# Patient Record
Sex: FEMALE | Race: WHITE | Employment: OTHER | URBAN - NONMETROPOLITAN AREA
[De-identification: names, ages, dates, MRNs, and addresses within clinical notes are randomized per-mention and may not be internally consistent; named-entity substitution may affect disease eponyms.]

---

## 2017-01-04 ENCOUNTER — OFFICE VISIT (OUTPATIENT)
Dept: CARDIOLOGY | Age: 52
End: 2017-01-04
Payer: COMMERCIAL

## 2017-01-04 VITALS
HEIGHT: 68 IN | BODY MASS INDEX: 25.16 KG/M2 | WEIGHT: 166 LBS | HEART RATE: 72 BPM | SYSTOLIC BLOOD PRESSURE: 128 MMHG | DIASTOLIC BLOOD PRESSURE: 90 MMHG

## 2017-01-04 DIAGNOSIS — I63.9 CRYPTOGENIC STROKE (HCC): Primary | ICD-10-CM

## 2017-01-04 DIAGNOSIS — I10 ESSENTIAL HYPERTENSION: ICD-10-CM

## 2017-01-04 DIAGNOSIS — Z95.818 STATUS POST PLACEMENT OF IMPLANTABLE LOOP RECORDER: ICD-10-CM

## 2017-01-04 PROCEDURE — 99213 OFFICE O/P EST LOW 20 MIN: CPT | Performed by: CLINICAL NURSE SPECIALIST

## 2017-01-04 PROCEDURE — 93291 INTERROG DEV EVAL SCRMS IP: CPT | Performed by: CLINICAL NURSE SPECIALIST

## 2017-01-04 ASSESSMENT — ENCOUNTER SYMPTOMS
HEARTBURN: 0
NAUSEA: 0
ORTHOPNEA: 0
SHORTNESS OF BREATH: 0
ABDOMINAL PAIN: 0
COUGH: 0
VOMITING: 0
BLURRED VISION: 0

## 2017-02-07 DIAGNOSIS — Z95.818 STATUS POST PLACEMENT OF IMPLANTABLE LOOP RECORDER: Primary | ICD-10-CM

## 2017-02-07 DIAGNOSIS — I63.9 CRYPTOGENIC STROKE (HCC): ICD-10-CM

## 2017-02-07 PROCEDURE — 93299 PR REM INTERROG ICPMS/SCRMS <30 D TECH REVIEW: CPT | Performed by: CLINICAL NURSE SPECIALIST

## 2017-02-07 PROCEDURE — 93298 REM INTERROG DEV EVAL SCRMS: CPT | Performed by: CLINICAL NURSE SPECIALIST

## 2017-02-28 ENCOUNTER — OFFICE VISIT (OUTPATIENT)
Dept: PRIMARY CARE CLINIC | Age: 52
End: 2017-02-28
Payer: COMMERCIAL

## 2017-02-28 VITALS
DIASTOLIC BLOOD PRESSURE: 82 MMHG | HEIGHT: 69 IN | BODY MASS INDEX: 24.44 KG/M2 | HEART RATE: 74 BPM | SYSTOLIC BLOOD PRESSURE: 132 MMHG | RESPIRATION RATE: 16 BRPM | TEMPERATURE: 97.4 F | WEIGHT: 165 LBS

## 2017-02-28 DIAGNOSIS — I83.93 SPIDER VEINS OF BOTH LOWER EXTREMITIES: ICD-10-CM

## 2017-02-28 DIAGNOSIS — Z01.419 WELL FEMALE EXAM WITH ROUTINE GYNECOLOGICAL EXAM: Primary | ICD-10-CM

## 2017-02-28 DIAGNOSIS — Z12.4 PAP SMEAR FOR CERVICAL CANCER SCREENING: ICD-10-CM

## 2017-02-28 DIAGNOSIS — Z12.31 ENCOUNTER FOR SCREENING MAMMOGRAM FOR BREAST CANCER: ICD-10-CM

## 2017-02-28 LAB
ALBUMIN SERPL-MCNC: 4.5 G/DL (ref 3.5–5.2)
ALP BLD-CCNC: 85 U/L (ref 35–104)
ALT SERPL-CCNC: 23 U/L (ref 5–33)
ANION GAP SERPL CALCULATED.3IONS-SCNC: 18 MMOL/L (ref 7–19)
AST SERPL-CCNC: 22 U/L (ref 5–32)
BILIRUB SERPL-MCNC: 0.4 MG/DL (ref 0.2–1.2)
BUN BLDV-MCNC: 14 MG/DL (ref 6–20)
CALCIUM SERPL-MCNC: 9.8 MG/DL (ref 8.6–10)
CHLORIDE BLD-SCNC: 97 MMOL/L (ref 98–111)
CHOLESTEROL, TOTAL: 187 MG/DL (ref 160–199)
CO2: 28 MMOL/L (ref 22–29)
CREAT SERPL-MCNC: 0.7 MG/DL (ref 0.5–0.9)
GFR NON-AFRICAN AMERICAN: >60
GLOBULIN: 3.2 G/DL
GLUCOSE BLD-MCNC: 99 MG/DL (ref 74–109)
HCT VFR BLD CALC: 40.2 % (ref 37–47)
HDLC SERPL-MCNC: 62 MG/DL (ref 65–121)
HEMOGLOBIN: 12.1 G/DL (ref 12–16)
LDL CHOLESTEROL CALCULATED: 87 MG/DL
MCH RBC QN AUTO: 24 PG (ref 27–31)
MCHC RBC AUTO-ENTMCNC: 30.1 G/DL (ref 33–37)
MCV RBC AUTO: 79.8 FL (ref 81–99)
PDW BLD-RTO: 16.5 % (ref 11.5–14.5)
PLATELET # BLD: 306 K/UL (ref 130–400)
PMV BLD AUTO: 11.3 FL (ref 7.4–10.4)
POTASSIUM SERPL-SCNC: 3.8 MMOL/L (ref 3.5–5)
RBC # BLD: 5.04 M/UL (ref 4.2–5.4)
SODIUM BLD-SCNC: 143 MMOL/L (ref 136–145)
TOTAL PROTEIN: 7.7 G/DL (ref 6.6–8.7)
TRIGL SERPL-MCNC: 189 MG/DL (ref 150–199)
WBC # BLD: 7 K/UL (ref 4.8–10.8)

## 2017-02-28 PROCEDURE — 99396 PREV VISIT EST AGE 40-64: CPT | Performed by: FAMILY MEDICINE

## 2017-02-28 PROCEDURE — 36415 COLL VENOUS BLD VENIPUNCTURE: CPT | Performed by: FAMILY MEDICINE

## 2017-03-03 ENCOUNTER — HOSPITAL ENCOUNTER (OUTPATIENT)
Dept: VASCULAR LAB | Age: 52
Discharge: HOME | End: 2017-03-03
Payer: COMMERCIAL

## 2017-03-03 DIAGNOSIS — I83.93 SPIDER VEINS OF BOTH LOWER EXTREMITIES: Primary | ICD-10-CM

## 2017-03-03 PROCEDURE — 93970 EXTREMITY STUDY: CPT

## 2017-03-09 DIAGNOSIS — I63.9 CRYPTOGENIC STROKE (HCC): ICD-10-CM

## 2017-03-09 DIAGNOSIS — Z95.818 STATUS POST PLACEMENT OF IMPLANTABLE LOOP RECORDER: Primary | ICD-10-CM

## 2017-03-09 PROCEDURE — 93299 PR REM INTERROG ICPMS/SCRMS <30 D TECH REVIEW: CPT | Performed by: CLINICAL NURSE SPECIALIST

## 2017-03-09 PROCEDURE — 93298 REM INTERROG DEV EVAL SCRMS: CPT | Performed by: CLINICAL NURSE SPECIALIST

## 2017-03-10 ENCOUNTER — TELEPHONE (OUTPATIENT)
Dept: NEUROLOGY | Age: 52
End: 2017-03-10

## 2017-04-10 ENCOUNTER — TELEPHONE (OUTPATIENT)
Dept: CARDIOLOGY | Age: 52
End: 2017-04-10

## 2017-05-02 RX ORDER — ATORVASTATIN CALCIUM 20 MG/1
TABLET, FILM COATED ORAL
Qty: 30 TABLET | Refills: 0 | Status: SHIPPED | OUTPATIENT
Start: 2017-05-02 | End: 2017-05-12 | Stop reason: SDUPTHER

## 2017-05-03 ENCOUNTER — OFFICE VISIT (OUTPATIENT)
Dept: CARDIOLOGY | Age: 52
End: 2017-05-03
Payer: COMMERCIAL

## 2017-05-03 VITALS
SYSTOLIC BLOOD PRESSURE: 136 MMHG | HEART RATE: 74 BPM | WEIGHT: 166 LBS | BODY MASS INDEX: 24.59 KG/M2 | HEIGHT: 69 IN | DIASTOLIC BLOOD PRESSURE: 84 MMHG

## 2017-05-03 DIAGNOSIS — I10 ESSENTIAL HYPERTENSION: ICD-10-CM

## 2017-05-03 DIAGNOSIS — Z95.818 STATUS POST PLACEMENT OF IMPLANTABLE LOOP RECORDER: ICD-10-CM

## 2017-05-03 DIAGNOSIS — I63.9 CRYPTOGENIC STROKE (HCC): Primary | ICD-10-CM

## 2017-05-03 PROCEDURE — 99213 OFFICE O/P EST LOW 20 MIN: CPT | Performed by: CLINICAL NURSE SPECIALIST

## 2017-05-03 PROCEDURE — 93291 INTERROG DEV EVAL SCRMS IP: CPT | Performed by: CLINICAL NURSE SPECIALIST

## 2017-05-03 ASSESSMENT — ENCOUNTER SYMPTOMS
ORTHOPNEA: 0
COUGH: 0
VOMITING: 0
BLURRED VISION: 0
HEARTBURN: 0
SHORTNESS OF BREATH: 0
NAUSEA: 0

## 2017-05-12 RX ORDER — ATORVASTATIN CALCIUM 20 MG/1
TABLET, FILM COATED ORAL
Qty: 90 TABLET | Refills: 3 | Status: SHIPPED | OUTPATIENT
Start: 2017-05-12 | End: 2017-06-23 | Stop reason: SDUPTHER

## 2017-05-12 RX ORDER — HYDROCHLOROTHIAZIDE 25 MG/1
TABLET ORAL
Qty: 90 TABLET | Refills: 3 | Status: SHIPPED | OUTPATIENT
Start: 2017-05-12 | End: 2018-03-05 | Stop reason: SDUPTHER

## 2017-06-06 ENCOUNTER — TELEPHONE (OUTPATIENT)
Dept: CARDIOLOGY | Age: 52
End: 2017-06-06

## 2017-06-07 ENCOUNTER — TELEPHONE (OUTPATIENT)
Dept: CARDIOLOGY | Age: 52
End: 2017-06-07

## 2017-06-14 DIAGNOSIS — Z95.818 STATUS POST PLACEMENT OF IMPLANTABLE LOOP RECORDER: ICD-10-CM

## 2017-06-14 DIAGNOSIS — I63.9 CRYPTOGENIC STROKE (HCC): Primary | ICD-10-CM

## 2017-06-14 PROCEDURE — 93298 REM INTERROG DEV EVAL SCRMS: CPT | Performed by: CLINICAL NURSE SPECIALIST

## 2017-06-14 PROCEDURE — 93299 PR REM INTERROG ICPMS/SCRMS <30 D TECH REVIEW: CPT | Performed by: CLINICAL NURSE SPECIALIST

## 2017-06-23 RX ORDER — ATORVASTATIN CALCIUM 20 MG/1
TABLET, FILM COATED ORAL
Qty: 90 TABLET | Refills: 3 | Status: SHIPPED | OUTPATIENT
Start: 2017-06-23 | End: 2018-03-05 | Stop reason: SDUPTHER

## 2017-06-29 ENCOUNTER — TELEPHONE (OUTPATIENT)
Dept: CARDIOLOGY | Age: 52
End: 2017-06-29

## 2017-07-13 ENCOUNTER — HOSPITAL ENCOUNTER (OUTPATIENT)
Dept: WOMENS IMAGING | Age: 52
Discharge: HOME | End: 2017-07-13
Payer: COMMERCIAL

## 2017-07-13 DIAGNOSIS — Z12.31 ENCOUNTER FOR SCREENING MAMMOGRAM FOR BREAST CANCER: ICD-10-CM

## 2017-07-13 PROCEDURE — 77063 BREAST TOMOSYNTHESIS BI: CPT

## 2017-07-26 DIAGNOSIS — Z95.818 STATUS POST PLACEMENT OF IMPLANTABLE LOOP RECORDER: Primary | ICD-10-CM

## 2017-07-26 DIAGNOSIS — I63.9 CRYPTOGENIC STROKE (HCC): ICD-10-CM

## 2017-07-26 PROCEDURE — 93298 REM INTERROG DEV EVAL SCRMS: CPT | Performed by: CLINICAL NURSE SPECIALIST

## 2017-07-26 PROCEDURE — 93299 PR REM INTERROG ICPMS/SCRMS <30 D TECH REVIEW: CPT | Performed by: CLINICAL NURSE SPECIALIST

## 2017-09-05 ENCOUNTER — TELEPHONE (OUTPATIENT)
Dept: PRIMARY CARE CLINIC | Age: 52
End: 2017-09-05

## 2017-09-05 RX ORDER — DEXTROMETHORPHAN HYDROBROMIDE AND PROMETHAZINE HYDROCHLORIDE 15; 6.25 MG/5ML; MG/5ML
SYRUP ORAL
Qty: 118 ML | Refills: 1 | Status: SHIPPED | OUTPATIENT
Start: 2017-09-05 | End: 2017-11-01

## 2017-09-06 DIAGNOSIS — Z45.09 ENCOUNTER FOR LOOP RECORDER CHECK: Primary | ICD-10-CM

## 2017-09-06 DIAGNOSIS — I63.9 CRYPTOGENIC STROKE (HCC): ICD-10-CM

## 2017-09-06 PROCEDURE — 93298 REM INTERROG DEV EVAL SCRMS: CPT | Performed by: CLINICAL NURSE SPECIALIST

## 2017-09-06 PROCEDURE — 93299 PR REM INTERROG ICPMS/SCRMS <30 D TECH REVIEW: CPT | Performed by: CLINICAL NURSE SPECIALIST

## 2017-09-11 ENCOUNTER — OFFICE VISIT (OUTPATIENT)
Dept: PRIMARY CARE CLINIC | Age: 52
End: 2017-09-11
Payer: COMMERCIAL

## 2017-09-11 VITALS
SYSTOLIC BLOOD PRESSURE: 122 MMHG | WEIGHT: 163 LBS | HEART RATE: 80 BPM | DIASTOLIC BLOOD PRESSURE: 70 MMHG | RESPIRATION RATE: 20 BRPM | BODY MASS INDEX: 24.14 KG/M2 | TEMPERATURE: 98 F | HEIGHT: 69 IN

## 2017-09-11 DIAGNOSIS — R09.89 SCATTERED RHONCHI OF RIGHT LUNG: ICD-10-CM

## 2017-09-11 DIAGNOSIS — J06.9 URI WITH COUGH AND CONGESTION: Primary | ICD-10-CM

## 2017-09-11 PROCEDURE — 99213 OFFICE O/P EST LOW 20 MIN: CPT | Performed by: FAMILY MEDICINE

## 2017-09-11 RX ORDER — ALBUTEROL SULFATE 90 UG/1
2 AEROSOL, METERED RESPIRATORY (INHALATION) EVERY 6 HOURS PRN
Qty: 1 INHALER | Refills: 1 | Status: SHIPPED | OUTPATIENT
Start: 2017-09-11 | End: 2020-05-26

## 2017-09-11 RX ORDER — AMOXICILLIN AND CLAVULANATE POTASSIUM 875; 125 MG/1; MG/1
TABLET, FILM COATED ORAL
Qty: 20 TABLET | Refills: 0 | Status: SHIPPED | OUTPATIENT
Start: 2017-09-11 | End: 2017-10-04

## 2017-09-17 ASSESSMENT — ENCOUNTER SYMPTOMS
SORE THROAT: 1
COUGH: 1
SINUS PRESSURE: 1
SHORTNESS OF BREATH: 1

## 2017-09-18 ENCOUNTER — HOSPITAL ENCOUNTER (OUTPATIENT)
Dept: GENERAL RADIOLOGY | Age: 52
Discharge: HOME | End: 2017-09-18
Payer: COMMERCIAL

## 2017-09-18 DIAGNOSIS — R05.9 COUGH: ICD-10-CM

## 2017-09-18 DIAGNOSIS — R05.9 COUGH: Primary | ICD-10-CM

## 2017-09-18 PROCEDURE — 71020 XR CHEST STANDARD TWO VW: CPT

## 2017-09-19 ENCOUNTER — PATIENT MESSAGE (OUTPATIENT)
Dept: PRIMARY CARE CLINIC | Age: 52
End: 2017-09-19

## 2017-09-19 RX ORDER — METHYLPREDNISOLONE 4 MG/1
TABLET ORAL
Qty: 1 KIT | Refills: 0 | Status: SHIPPED | OUTPATIENT
Start: 2017-09-19 | End: 2017-09-25

## 2017-09-19 RX ORDER — PANTOPRAZOLE SODIUM 40 MG/1
40 TABLET, DELAYED RELEASE ORAL DAILY
Qty: 30 TABLET | Refills: 3 | Status: SHIPPED | OUTPATIENT
Start: 2017-09-19 | End: 2017-11-01

## 2017-09-27 ENCOUNTER — OFFICE VISIT (OUTPATIENT)
Dept: PRIMARY CARE CLINIC | Age: 52
End: 2017-09-27
Payer: COMMERCIAL

## 2017-09-27 VITALS
TEMPERATURE: 97.2 F | SYSTOLIC BLOOD PRESSURE: 147 MMHG | RESPIRATION RATE: 20 BRPM | HEART RATE: 70 BPM | WEIGHT: 164 LBS | HEIGHT: 69 IN | BODY MASS INDEX: 24.29 KG/M2 | DIASTOLIC BLOOD PRESSURE: 90 MMHG

## 2017-09-27 DIAGNOSIS — J40 BRONCHITIS: Primary | ICD-10-CM

## 2017-09-27 PROCEDURE — 99213 OFFICE O/P EST LOW 20 MIN: CPT | Performed by: FAMILY MEDICINE

## 2017-09-27 PROCEDURE — 96372 THER/PROPH/DIAG INJ SC/IM: CPT | Performed by: FAMILY MEDICINE

## 2017-09-27 RX ORDER — BETAMETHASONE SODIUM PHOSPHATE AND BETAMETHASONE ACETATE 3; 3 MG/ML; MG/ML
12 INJECTION, SUSPENSION INTRA-ARTICULAR; INTRALESIONAL; INTRAMUSCULAR; SOFT TISSUE ONCE
Status: COMPLETED | OUTPATIENT
Start: 2017-09-27 | End: 2017-09-27

## 2017-09-27 RX ORDER — LEVOFLOXACIN 500 MG/1
500 TABLET, FILM COATED ORAL DAILY
Qty: 10 TABLET | Refills: 0 | Status: SHIPPED | OUTPATIENT
Start: 2017-09-27 | End: 2017-10-07

## 2017-09-27 RX ORDER — FEXOFENADINE HCL AND PSEUDOEPHEDRINE HCI 180; 240 MG/1; MG/1
1 TABLET, EXTENDED RELEASE ORAL DAILY
Qty: 20 TABLET | Refills: 0 | Status: SHIPPED | OUTPATIENT
Start: 2017-09-27 | End: 2017-11-01

## 2017-09-27 RX ORDER — FLUTICASONE PROPIONATE 220 UG/1
1 AEROSOL, METERED RESPIRATORY (INHALATION) 2 TIMES DAILY
Qty: 1 INHALER | Refills: 3 | Status: SHIPPED | OUTPATIENT
Start: 2017-09-27 | End: 2017-11-01

## 2017-09-27 RX ADMIN — BETAMETHASONE SODIUM PHOSPHATE AND BETAMETHASONE ACETATE 12 MG: 3; 3 INJECTION, SUSPENSION INTRA-ARTICULAR; INTRALESIONAL; INTRAMUSCULAR; SOFT TISSUE at 11:15

## 2017-09-27 ASSESSMENT — ENCOUNTER SYMPTOMS
WHEEZING: 1
VOMITING: 0
SINUS PRESSURE: 1
RHINORRHEA: 1
SORE THROAT: 1
ABDOMINAL PAIN: 0
COLOR CHANGE: 0
DIARRHEA: 0
SHORTNESS OF BREATH: 1
BACK PAIN: 0
COUGH: 1
EYE DISCHARGE: 0
NAUSEA: 0

## 2017-10-04 ENCOUNTER — OFFICE VISIT (OUTPATIENT)
Dept: OBGYN | Age: 52
End: 2017-10-04
Payer: COMMERCIAL

## 2017-10-04 VITALS
HEART RATE: 82 BPM | SYSTOLIC BLOOD PRESSURE: 147 MMHG | DIASTOLIC BLOOD PRESSURE: 89 MMHG | HEIGHT: 69 IN | WEIGHT: 161 LBS | BODY MASS INDEX: 23.85 KG/M2

## 2017-10-04 DIAGNOSIS — N85.2 UTERINE ENLARGEMENT: ICD-10-CM

## 2017-10-04 DIAGNOSIS — N95.0 PMB (POSTMENOPAUSAL BLEEDING): Primary | ICD-10-CM

## 2017-10-04 PROCEDURE — 99204 OFFICE O/P NEW MOD 45 MIN: CPT | Performed by: OBSTETRICS & GYNECOLOGY

## 2017-10-04 ASSESSMENT — ENCOUNTER SYMPTOMS
EYES NEGATIVE: 1
ALLERGIC/IMMUNOLOGIC NEGATIVE: 1
BACK PAIN: 0
DIARRHEA: 0
TROUBLE SWALLOWING: 0
BLOOD IN STOOL: 0
SHORTNESS OF BREATH: 0
CONSTIPATION: 1
COLOR CHANGE: 0
COUGH: 0

## 2017-10-04 NOTE — MR AVS SNAPSHOT
US Pelvis Complete [[id number] Custom]  10/4/2017 10/4/2018    Follow-Up    Return if symptoms worsen or fail to improve. Information from Your Visit        Department     Name Address Phone Fax    [facility] [address]      You Were Seen for:         Comments    PMB (postmenopausal bleeding)   [[id number]]         Vital Signs     Blood Pressure Pulse Height Weight Last Menstrual Period Breastfeeding? 147/89 (Site: Left Arm, Position: Sitting, Cuff Size: Medium Adult) 82 5' 9\" (1.753 m) 161 lb (73 kg) 08/24/2017 No    Body Mass Index Smoking Status                23.78 kg/m2 Never Smoker             Today's Medication Changes          These changes are accurate as of: 10/4/17  2:53 PM.  If you have any questions, ask your nurse or doctor.                STOP taking these medications           amoxicillin-clavulanate 875-125 MG per tablet   Commonly known as:  AUGMENTIN   Stopped by:  Bisi Min MD               Your Current Medications Are              fexofenadine-pseudoephedrine (ALLEGRA-D ALLERGY & CONGESTION) 180-240 MG per extended release tablet Take 1 tablet by mouth daily    levofloxacin (LEVAQUIN) 500 MG tablet Take 1 tablet by mouth daily for 10 days    fluticasone (FLOVENT HFA) 220 MCG/ACT inhaler Inhale 1 puff into the lungs 2 times daily    pantoprazole (PROTONIX) 40 MG tablet Take 1 tablet by mouth daily    albuterol sulfate HFA (PROVENTIL HFA) 108 (90 Base) MCG/ACT inhaler Inhale 2 puffs into the lungs every 6 hours as needed for Wheezing    promethazine-dextromethorphan (PROMETHAZINE-DM) 6.25-15 MG/5ML syrup 1 tsp po qhs prn severe cough    atorvastatin (LIPITOR) 20 MG tablet 1 tablet by mouth at bedtime for high cholesterol    hydrochlorothiazide (HYDRODIURIL) 25 MG tablet 1 tablet by mouth once a day for fluid and blood pressure    aspirin 81 MG EC tablet Take 1 tablet by mouth daily [employer] username and password. If you don't have a [employer] username and password but a parent or guardian has access to your record, the parent or guardian should login with their own [employer] username and password and access your record to view the After Visit Summary. Additional Information  If you have questions, please contact the physician practice where you receive care. Remember, [employer] is NOT to be used for urgent needs. For medical emergencies, dial 911. For questions regarding your [employer] account call [phone]. If you have a clinical question, please call your doctor's office.

## 2017-10-04 NOTE — PROGRESS NOTES
Subjective:      Patient ID: Daniel Harris is a 45 y.o. female. HPI     Pt presents today as referral from Dr. Teodoro Dee for menorrhagia with irregular menses. Pt is up to date on pap smear and mammogram.  Pt reports -Mar of 2016 having heavier periods than usual.  She had her last period April-May of 2016. She then started back -2017 and reports having \"the largest clots I've ever seen\". She stopped for a week and bled again -2017, spotting only. Reports no pain but felt like someone tearing at insides, -5/10. Pt denies hot flashes or night sweats. Pt has blood work with PCP office. No recent US. No change in appetite or weight. Denies dyspareunia or urinary changes. + constipation that she attributes to recent medications    Review of Systems   Constitutional: Negative for appetite change, fatigue, fever and unexpected weight change. HENT: Negative for hearing loss and trouble swallowing. Eyes: Negative. Negative for visual disturbance. Respiratory: Negative for cough and shortness of breath. Cardiovascular: Negative for chest pain and palpitations. Gastrointestinal: Positive for constipation. Negative for blood in stool and diarrhea. Endocrine: Negative. Genitourinary: Positive for menstrual problem. Negative for dyspareunia, dysuria, frequency, pelvic pain, urgency and vaginal discharge. Musculoskeletal: Negative for arthralgias, back pain and neck pain. Skin: Negative for color change and rash. Allergic/Immunologic: Negative. Neurological: Negative for dizziness, weakness and headaches. Hematological: Does not bruise/bleed easily. Psychiatric/Behavioral: Negative for agitation, confusion and sleep disturbance. The patient is not nervous/anxious. OB:   x 2    GYN:  13/irregular/7. No uterine or cervical sx. No STI.       Daniel Harris is a 45 y.o. female with the following history as recorded in [facility]:  Patient Active Problem List    Diagnosis Date Noted    Cryptogenic stroke ([facility] 12/03/2016    Status post placement of implantable loop recorder 11/23/2016    Essential hypertension 11/21/2016    Aphasia 11/20/2016    Colon cancer screening 11/04/2013    Hyperlipidemia 11/01/2013     Current Outpatient Prescriptions   Medication Sig Dispense Refill    fexofenadine-pseudoephedrine (ALLEGRA-D ALLERGY & CONGESTION) 180-240 MG per extended release tablet Take 1 tablet by mouth daily 20 tablet 0    levofloxacin (LEVAQUIN) 500 MG tablet Take 1 tablet by mouth daily for 10 days 10 tablet 0    fluticasone (FLOVENT HFA) 220 MCG/ACT inhaler Inhale 1 puff into the lungs 2 times daily 1 Inhaler 3    pantoprazole (PROTONIX) 40 MG tablet Take 1 tablet by mouth daily 30 tablet 3    albuterol sulfate HFA (PROVENTIL HFA) 108 (90 Base) MCG/ACT inhaler Inhale 2 puffs into the lungs every 6 hours as needed for Wheezing 1 Inhaler 1    promethazine-dextromethorphan (PROMETHAZINE-DM) 6.25-15 MG/5ML syrup 1 tsp po qhs prn severe cough 118 mL 1    atorvastatin (LIPITOR) 20 MG tablet 1 tablet by mouth at bedtime for high cholesterol 90 tablet 3    hydrochlorothiazide (HYDRODIURIL) 25 MG tablet 1 tablet by mouth once a day for fluid and blood pressure 90 tablet 3    aspirin 81 MG EC tablet Take 1 tablet by mouth daily 30 tablet 3    Multiple Vitamins-Minerals (THERAPEUTIC MULTIVITAMIN-MINERALS) tablet Take 1 tablet by mouth daily      Omega-3 Fatty Acids (FISH OIL) 1200 MG CAPS Take by mouth daily      Clobetasol Prop Oint-Coal Tar (CLOBETAPLUS OINTMENT EX) Apply 1 Applicatorful topically 2 times daily as needed        No current facility-administered medications for this visit. Allergies: Review of patient's allergies indicates no known allergies.   Past Medical History:   Diagnosis Date    Cryptogenic stroke ([facility] 11/23/2016    Dr. Crista Santiago    Hyperlipidemia     Hypertension     Status post placement of implantable loop recorder 11/23/2016 Dr Shiloh Gonzales     Past Surgical History:   Procedure Laterality Date    BREAST ENHANCEMENT SURGERY  1988    BREAST SURGERY      mass removal-right    COLONOSCOPY      TUBAL LIGATION       Family History   Problem Relation Age of Onset    Heart Disease Mother      cardiomyopathy, pacemaker    Hypertension Mother     Cancer Mother      non-hodgkins lymphoma in LL lobe, skin cancer    Other Mother      gb dz, phlebitis    Heart Disease Father     Lung Cancer Father     Heart Attack Brother      smoker, has stent    Heart Disease Sister      bypass, smoker    Thyroid Disease Sister      causes double vision    Heart Attack Sister     Elevated Lipids Sister     Thyroid Disease Sister     Elevated Lipids Sister     Elevated Lipids Sister     Heart Disease Sister      heart murmur    Other Brother      aaliyah dz     Social History   Substance Use Topics    Smoking status: Never Smoker    Smokeless tobacco: Never Used    Alcohol use Yes      Comment: rare     BP (!) 147/89 (Site: Left Arm, Position: Sitting, Cuff Size: Medium Adult)  Pulse 82  Ht 5' 9\" (1.753 m)  Wt 161 lb (73 kg)  LMP 08/24/2017  Breastfeeding? No Comment: 4/2016  BMI 23.78 kg/m2  Objective:   Physical Exam   Constitutional: She is oriented to person, place, and time. She appears well-developed and well-nourished. No distress. HENT:   Head: Normocephalic and atraumatic. Right Ear: External ear normal.   Left Ear: External ear normal.   Mouth/Throat: Oropharynx is clear and moist.   Eyes: Conjunctivae and EOM are normal. Pupils are equal, round, and reactive to light. Neck: Normal range of motion. Pulmonary/Chest: Effort normal. No respiratory distress. Abdominal: Soft. She exhibits no distension and no mass. There is no tenderness. There is no rebound and no guarding. Genitourinary: Rectum normal and vagina normal. There is no rash, tenderness or lesion on the right labia.  There is no rash, tenderness or lesion on the left labia. Uterus is enlarged. Uterus is not tender. Cervix exhibits no motion tenderness, no discharge and no friability. Right adnexum displays no mass, no tenderness and no fullness. Left adnexum displays no mass, no tenderness and no fullness. No bleeding in the vagina. Genitourinary Comments: Uterus 9 wk size with irregular contours palpated on the right. Musculoskeletal: Normal range of motion. She exhibits no edema or tenderness. Neurological: She is alert and oriented to person, place, and time. Skin: Skin is warm and dry. Psychiatric: She has a normal mood and affect. Her speech is normal and behavior is normal. Judgment and thought content normal. Cognition and memory are normal.       Assessment:      1. PMB (postmenopausal bleeding)  TSH with Reflex    CBC    US Pelvis Complete   2. Uterine enlargement  US Pelvis Complete           Plan:      1.  Tsh, cbc, pelvic u/s  2. Discussed possible etiologies of PMB including atrophy, fibroid, hyperplasia or endometrial malignancy. Based on exam today, suspect uterine fibroid. Discussed work-up. Patient states understanding  3.   RTC 2 weeks for EMB

## 2017-10-11 DIAGNOSIS — Z95.818 STATUS POST PLACEMENT OF IMPLANTABLE LOOP RECORDER: Primary | ICD-10-CM

## 2017-10-11 DIAGNOSIS — I63.9 CRYPTOGENIC STROKE (HCC): ICD-10-CM

## 2017-10-11 PROCEDURE — 93298 REM INTERROG DEV EVAL SCRMS: CPT | Performed by: CLINICAL NURSE SPECIALIST

## 2017-10-11 PROCEDURE — 93299 PR REM INTERROG ICPMS/SCRMS <30 D TECH REVIEW: CPT | Performed by: CLINICAL NURSE SPECIALIST

## 2017-10-20 ENCOUNTER — HOSPITAL ENCOUNTER (OUTPATIENT)
Dept: ULTRASOUND IMAGING | Age: 52
Discharge: HOME | End: 2017-10-20
Payer: COMMERCIAL

## 2017-10-20 ENCOUNTER — OFFICE VISIT (OUTPATIENT)
Dept: OBGYN | Age: 52
End: 2017-10-20
Payer: COMMERCIAL

## 2017-10-20 VITALS
DIASTOLIC BLOOD PRESSURE: 86 MMHG | SYSTOLIC BLOOD PRESSURE: 150 MMHG | BODY MASS INDEX: 23.85 KG/M2 | HEIGHT: 69 IN | WEIGHT: 161 LBS | HEART RATE: 68 BPM

## 2017-10-20 DIAGNOSIS — N85.2 UTERINE ENLARGEMENT: ICD-10-CM

## 2017-10-20 DIAGNOSIS — Z71.2 ENCOUNTER TO DISCUSS TEST RESULTS: Primary | ICD-10-CM

## 2017-10-20 DIAGNOSIS — N95.0 PMB (POSTMENOPAUSAL BLEEDING): ICD-10-CM

## 2017-10-20 DIAGNOSIS — R93.89 THICKENED ENDOMETRIUM: ICD-10-CM

## 2017-10-20 PROCEDURE — 99999 PR OFFICE/OUTPT VISIT,PROCEDURE ONLY: CPT | Performed by: OBSTETRICS & GYNECOLOGY

## 2017-10-20 PROCEDURE — 58100 BIOPSY OF UTERUS LINING: CPT | Performed by: OBSTETRICS & GYNECOLOGY

## 2017-10-20 PROCEDURE — 76830 TRANSVAGINAL US NON-OB: CPT

## 2017-10-20 PROCEDURE — 76856 US EXAM PELVIC COMPLETE: CPT

## 2017-10-20 ASSESSMENT — ENCOUNTER SYMPTOMS
BACK PAIN: 0
CONSTIPATION: 0
DIARRHEA: 0
SHORTNESS OF BREATH: 0
TROUBLE SWALLOWING: 0
COLOR CHANGE: 0
BLOOD IN STOOL: 0
COUGH: 0

## 2017-10-20 NOTE — PATIENT INSTRUCTIONS
Endometrial Biopsy: About This Test  What is it? An endometrial biopsy is a way for your doctor to take a small sample of the lining of the uterus (endometrium). The sample is looked at under a microscope for abnormal cells. An endometrial biopsy helps your doctor find problems in the endometrium. Why is this test done? An endometrial biopsy is done to check for cancer of the uterus. The test is also done if you have abnormal bleeding from your uterus or are having problems getting pregnant. The test results show how your body's hormones are affecting the lining of the uterus. How can you prepare for the test?  Talk to your doctor about all your health conditions before the test. For example, tell your doctor if you:  · Are or might be pregnant. An endometrial biopsy is not done during pregnancy. · Are taking any medicines. · Are allergic to any medicines. · Have had bleeding problems, or if you take aspirin or some other blood thinner. · Have been treated for an infection in your pelvic area. · Have any heart or lung problems. Other ways to prepare:  · Do not douche, use tampons, or use vaginal medicines for 24 hours before the test.  · Ask your doctor if you should take a pain reliever, such as ibuprofen (Advil or Motrin), 30 to 60 minutes before the test. This can help reduce any cramping pain that the test can cause. · Talk to your doctor if you have concerns about the need for the test, its risks, how it will be done, or what the results may mean. What happens before the test?  · You will empty your bladder just before the test.  · You will be asked to sign a consent form that says you understand the risks of the test and agree to have it done. What happens during the test?  · You will lie on an exam table. Your feet will be in stirrups. · The doctor may use a spray or injection to numb your cervix. The cervix is the opening to the uterus.   · The doctor will use a tool called a speculum to

## 2017-10-20 NOTE — PROGRESS NOTES
Subjective:      Patient ID: Liz Reese is a 45 y.o. female. HPI  Pt presents today for results of ultrasound and EMB procedure. Liz Reese is a 45 y. o. with history of menorrhagia who presents today for endometrial biopsy. BP (!) 150/86 (Site: Left Arm, Position: Sitting, Cuff Size: Medium Adult)   Pulse 68   Ht 5' 9\" (1.753 m)   Wt 161 lb (73 kg)   LMP 08/24/2017   Breastfeeding? No   BMI 23.78 kg/m²     Endometrial Biopsy Procedure Note    Pre-operative Diagnosis: PMB    Post-operative Diagnosis: same    Indications: postmenopausal bleeding    Procedure Details    Urine pregnancy test was not done. The risks (including infection, bleeding, pain, and uterine perforation) and benefits of the procedure were explained to the patient and Written informed consent was obtained. Antibiotic prophylaxis against endocarditis was not indicated. The patient was placed in the dorsal lithotomy position. Bimanual exam showed the uterus to be in the retroflexed position. A Graves' speculum inserted in the vagina, and the cervix prepped with povidone iodine. Endocervical curettage with a Kevorkian curette was performed. A sharp tenaculum was applied to the anterior lip of the cervix for stabilization. A sterile uterine sound was used to sound the uterus to a depth of 9cm. A Pipelle endometrial aspirator was used to sample the endometrium. Sample was sent for pathologic examination. Condition:  Stable    Complications:  None    Plan:    1. The patient was advised to call for any fever or for prolonged or severe pain or bleeding. She was advised to use OTC ibuprofen as needed for mild to moderate pain. She was advised to avoid vaginal intercourse for 48 hours or until the bleeding has completely stopped. 2. Will notify of results. Review of Systems   Constitutional: Negative for appetite change, fatigue, fever and unexpected weight change.    HENT: Negative for hearing loss and fluid and blood pressure 90 tablet 3    aspirin 81 MG EC tablet Take 1 tablet by mouth daily 30 tablet 3    Multiple Vitamins-Minerals (THERAPEUTIC MULTIVITAMIN-MINERALS) tablet Take 1 tablet by mouth daily      Omega-3 Fatty Acids (FISH OIL) 1200 MG CAPS Take by mouth daily      Clobetasol Prop Oint-Coal Tar (CLOBETAPLUS OINTMENT EX) Apply 1 Applicatorful topically 2 times daily as needed        No current facility-administered medications for this visit. Allergies: Review of patient's allergies indicates no known allergies. Past Medical History:   Diagnosis Date    Cryptogenic stroke ([address] 11/23/2016    Dr. Martha De La Paz Hyperlipidemia     Hypertension     Status post placement of implantable loop recorder 11/23/2016    Dr Sheree Rene     Past Surgical History:   Procedure Laterality Date    BREAST ENHANCEMENT SURGERY  1988    BREAST SURGERY      mass removal-right    COLONOSCOPY      TUBAL LIGATION       Family History   Problem Relation Age of Onset    Heart Disease Mother      cardiomyopathy, pacemaker    Hypertension Mother     Cancer Mother      non-hodgkins lymphoma in LL lobe, skin cancer    Other Mother      gb dz, phlebitis    Heart Disease Father     Lung Cancer Father     Heart Attack Brother      smoker, has stent    Heart Disease Sister      bypass, smoker    Thyroid Disease Sister      causes double vision    Heart Attack Sister     Elevated Lipids Sister     Thyroid Disease Sister     Elevated Lipids Sister     Elevated Lipids Sister     Heart Disease Sister      heart murmur    Other Brother      gb dz    Breast Cancer Paternal Cousin 48     Social History   Substance Use Topics    Smoking status: Never Smoker    Smokeless tobacco: Never Used    Alcohol use Yes      Comment: rare       Objective:   Physical Exam   Constitutional: She is oriented to person, place, and time. She appears well-developed and well-nourished. No distress.    HENT:   Head: Normocephalic and

## 2017-10-30 ENCOUNTER — TELEPHONE (OUTPATIENT)
Dept: OBGYN | Age: 52
End: 2017-10-30

## 2017-10-30 NOTE — TELEPHONE ENCOUNTER
Left message on patient's home and cell voicemail asking that she make an appointment to discuss results.   Appointment to be scheduled with me either Wednesday or Friday of this week

## 2017-11-01 ENCOUNTER — TELEPHONE (OUTPATIENT)
Dept: OBGYN | Age: 52
End: 2017-11-01

## 2017-11-01 ENCOUNTER — OFFICE VISIT (OUTPATIENT)
Dept: OBGYN | Age: 52
End: 2017-11-01
Payer: COMMERCIAL

## 2017-11-01 VITALS
DIASTOLIC BLOOD PRESSURE: 74 MMHG | HEIGHT: 69 IN | WEIGHT: 165 LBS | SYSTOLIC BLOOD PRESSURE: 134 MMHG | BODY MASS INDEX: 24.44 KG/M2

## 2017-11-01 DIAGNOSIS — Z71.2 ENCOUNTER TO DISCUSS TEST RESULTS: Primary | ICD-10-CM

## 2017-11-01 DIAGNOSIS — C55 MALIGNANT NEOPLASM OF UTERUS, UNSPECIFIED SITE (HCC): ICD-10-CM

## 2017-11-01 PROCEDURE — 99213 OFFICE O/P EST LOW 20 MIN: CPT | Performed by: OBSTETRICS & GYNECOLOGY

## 2017-11-01 ASSESSMENT — ENCOUNTER SYMPTOMS
ALLERGIC/IMMUNOLOGIC NEGATIVE: 1
GASTROINTESTINAL NEGATIVE: 1
RESPIRATORY NEGATIVE: 1
EYES NEGATIVE: 1

## 2017-11-01 NOTE — PROGRESS NOTES
Subjective:      Patient ID: Geoff Loja is a 45 y.o. female. Pt is here to discuss results/options for PMB, thickened endometrium. Pt had EMB on 10.20.17. Pathology showed endometrial adenocarcinoma, endometrioid type, FIGO, Grade 3. HPI   46 yo female with h/o PMB who presents today for test results from EMB done on 10-24-17. She has no new complaints. Review of Systems   Constitutional: Negative. HENT: Negative. Eyes: Negative. Respiratory: Negative. Cardiovascular: Negative. Gastrointestinal: Negative. Endocrine: Negative. Genitourinary: Positive for menstrual problem. Musculoskeletal: Negative. Skin: Negative. Allergic/Immunologic: Negative. Neurological: Negative. Hematological: Negative. Psychiatric/Behavioral: Negative.       Geoff Loja is a 45 y.o. female with the following history as recorded in [facility]:  Patient Active Problem List    Diagnosis Date Noted    Cryptogenic stroke ([facility] 12/03/2016    Status post placement of implantable loop recorder 11/23/2016    Essential hypertension 11/21/2016    Aphasia 11/20/2016    Colon cancer screening 11/04/2013    Hyperlipidemia 11/01/2013     Current Outpatient Prescriptions   Medication Sig Dispense Refill    albuterol sulfate HFA (PROVENTIL HFA) 108 (90 Base) MCG/ACT inhaler Inhale 2 puffs into the lungs every 6 hours as needed for Wheezing 1 Inhaler 1    atorvastatin (LIPITOR) 20 MG tablet 1 tablet by mouth at bedtime for high cholesterol 90 tablet 3    hydrochlorothiazide (HYDRODIURIL) 25 MG tablet 1 tablet by mouth once a day for fluid and blood pressure 90 tablet 3    aspirin 81 MG EC tablet Take 1 tablet by mouth daily 30 tablet 3    Multiple Vitamins-Minerals (THERAPEUTIC MULTIVITAMIN-MINERALS) tablet Take 1 tablet by mouth daily      Omega-3 Fatty Acids (FISH OIL) 1200 MG CAPS Take by mouth daily      Clobetasol Prop Oint-Coal Tar (CLOBETAPLUS OINTMENT EX) Apply 1 Applicatorful topically 2 times daily as needed        No current facility-administered medications for this visit. Allergies: Review of patient's allergies indicates no known allergies. Past Medical History:   Diagnosis Date    Bronchitis     Cryptogenic stroke ([address] 11/23/2016    Dr. Facundo Carrion    Hyperlipidemia     Hypertension     Status post placement of implantable loop recorder 11/23/2016    Dr Lynne Marcos     Past Surgical History:   Procedure Laterality Date    BREAST ENHANCEMENT SURGERY  1988    BREAST SURGERY      mass removal-right    COLONOSCOPY      TUBAL LIGATION       Family History   Problem Relation Age of Onset    Heart Disease Mother      cardiomyopathy, pacemaker    Hypertension Mother     Cancer Mother      non-hodgkins lymphoma in LL lobe, skin cancer    Other Mother      gb dz, phlebitis    Heart Disease Father     Lung Cancer Father     Heart Attack Brother      smoker, has stent    Heart Disease Sister      bypass, smoker    Thyroid Disease Sister      causes double vision    Heart Attack Sister     Elevated Lipids Sister     Thyroid Disease Sister     Elevated Lipids Sister     Elevated Lipids Sister     Heart Disease Sister      heart murmur    Other Brother      gb dz    Breast Cancer Paternal Cousin 48     Social History   Substance Use Topics    Smoking status: Never Smoker    Smokeless tobacco: Never Used    Alcohol use Yes      Comment: rare     /74 (Site: Right Arm, Position: Sitting, Cuff Size: Medium Adult)   Ht 5' 9\" (1.753 m)   Wt 165 lb (74.8 kg)   LMP 08/24/2017   BMI 24.37 kg/m²   Objective:   Physical Exam   Constitutional: She is oriented to person, place, and time. She appears well-developed and well-nourished. No distress. HENT:   Head: Normocephalic and atraumatic. Eyes: Conjunctivae and EOM are normal. Pupils are equal, round, and reactive to light. Neck: Normal range of motion. Pulmonary/Chest: Effort normal. No respiratory distress.

## 2017-11-27 DIAGNOSIS — Z95.818 STATUS POST PLACEMENT OF IMPLANTABLE LOOP RECORDER: Primary | ICD-10-CM

## 2017-11-27 DIAGNOSIS — I63.9 CRYPTOGENIC STROKE (HCC): ICD-10-CM

## 2017-11-27 PROCEDURE — 93299 PR REM INTERROG ICPMS/SCRMS <30 D TECH REVIEW: CPT | Performed by: CLINICAL NURSE SPECIALIST

## 2017-11-27 PROCEDURE — 93298 REM INTERROG DEV EVAL SCRMS: CPT | Performed by: CLINICAL NURSE SPECIALIST

## 2018-01-08 DIAGNOSIS — Z95.818 STATUS POST PLACEMENT OF IMPLANTABLE LOOP RECORDER: Primary | ICD-10-CM

## 2018-01-08 DIAGNOSIS — I63.9 CRYPTOGENIC STROKE (HCC): ICD-10-CM

## 2018-01-08 PROCEDURE — 93298 REM INTERROG DEV EVAL SCRMS: CPT | Performed by: CLINICAL NURSE SPECIALIST

## 2018-01-08 PROCEDURE — 93299 PR REM INTERROG ICPMS/SCRMS <30 D TECH REVIEW: CPT | Performed by: CLINICAL NURSE SPECIALIST

## 2018-02-21 DIAGNOSIS — I63.9 CRYPTOGENIC STROKE (HCC): ICD-10-CM

## 2018-02-21 DIAGNOSIS — Z95.818 STATUS POST PLACEMENT OF IMPLANTABLE LOOP RECORDER: Primary | ICD-10-CM

## 2018-02-21 PROCEDURE — 93299 PR REM INTERROG ICPMS/SCRMS <30 D TECH REVIEW: CPT | Performed by: NURSE PRACTITIONER

## 2018-02-21 PROCEDURE — 93298 REM INTERROG DEV EVAL SCRMS: CPT | Performed by: NURSE PRACTITIONER

## 2018-03-05 ENCOUNTER — OFFICE VISIT (OUTPATIENT)
Dept: PRIMARY CARE CLINIC | Age: 53
End: 2018-03-05
Payer: COMMERCIAL

## 2018-03-05 VITALS
SYSTOLIC BLOOD PRESSURE: 122 MMHG | WEIGHT: 160 LBS | BODY MASS INDEX: 23.7 KG/M2 | HEIGHT: 69 IN | RESPIRATION RATE: 20 BRPM | HEART RATE: 87 BPM | TEMPERATURE: 98.1 F | DIASTOLIC BLOOD PRESSURE: 80 MMHG | OXYGEN SATURATION: 95 %

## 2018-03-05 DIAGNOSIS — Z01.419 WELL FEMALE EXAM WITH ROUTINE GYNECOLOGICAL EXAM: Primary | ICD-10-CM

## 2018-03-05 DIAGNOSIS — Z12.31 ENCOUNTER FOR SCREENING MAMMOGRAM FOR BREAST CANCER: ICD-10-CM

## 2018-03-05 DIAGNOSIS — Z23 NEED FOR DIPHTHERIA-TETANUS-PERTUSSIS (TDAP) VACCINE, ADULT/ADOLESCENT: ICD-10-CM

## 2018-03-05 DIAGNOSIS — Z11.59 ENCOUNTER FOR HEPATITIS C SCREENING TEST FOR LOW RISK PATIENT: ICD-10-CM

## 2018-03-05 LAB
ALBUMIN SERPL-MCNC: 4.4 G/DL (ref 3.5–5.2)
ALP BLD-CCNC: 88 U/L (ref 35–104)
ALT SERPL-CCNC: 21 U/L (ref 5–33)
ANION GAP SERPL CALCULATED.3IONS-SCNC: 13 MMOL/L (ref 7–19)
AST SERPL-CCNC: 25 U/L (ref 5–32)
BILIRUB SERPL-MCNC: 0.3 MG/DL (ref 0.2–1.2)
BUN BLDV-MCNC: 15 MG/DL (ref 6–20)
CALCIUM SERPL-MCNC: 9.7 MG/DL (ref 8.6–10)
CHLORIDE BLD-SCNC: 99 MMOL/L (ref 98–111)
CHOLESTEROL, TOTAL: 172 MG/DL (ref 160–199)
CO2: 30 MMOL/L (ref 22–29)
CREAT SERPL-MCNC: 0.7 MG/DL (ref 0.5–0.9)
GFR NON-AFRICAN AMERICAN: >60
GLUCOSE BLD-MCNC: 93 MG/DL (ref 74–109)
HCT VFR BLD CALC: 40.2 % (ref 37–47)
HDLC SERPL-MCNC: 63 MG/DL (ref 65–121)
HEMOGLOBIN: 12.4 G/DL (ref 12–16)
HEPATITIS C ANTIBODY INTERPRETATION: NORMAL
LDL CHOLESTEROL CALCULATED: 83 MG/DL
MCH RBC QN AUTO: 23.4 PG (ref 27–31)
MCHC RBC AUTO-ENTMCNC: 30.8 G/DL (ref 33–37)
MCV RBC AUTO: 75.8 FL (ref 81–99)
PDW BLD-RTO: 20.6 % (ref 11.5–14.5)
PLATELET # BLD: 298 K/UL (ref 130–400)
PMV BLD AUTO: 10 FL (ref 9.4–12.3)
POTASSIUM SERPL-SCNC: 3.5 MMOL/L (ref 3.5–5)
RBC # BLD: 5.3 M/UL (ref 4.2–5.4)
SODIUM BLD-SCNC: 142 MMOL/L (ref 136–145)
TOTAL PROTEIN: 7.9 G/DL (ref 6.6–8.7)
TRIGL SERPL-MCNC: 129 MG/DL (ref 0–149)
WBC # BLD: 6.5 K/UL (ref 4.8–10.8)

## 2018-03-05 PROCEDURE — 90471 IMMUNIZATION ADMIN: CPT | Performed by: FAMILY MEDICINE

## 2018-03-05 PROCEDURE — 36415 COLL VENOUS BLD VENIPUNCTURE: CPT | Performed by: FAMILY MEDICINE

## 2018-03-05 PROCEDURE — 99396 PREV VISIT EST AGE 40-64: CPT | Performed by: FAMILY MEDICINE

## 2018-03-05 PROCEDURE — 90715 TDAP VACCINE 7 YRS/> IM: CPT | Performed by: FAMILY MEDICINE

## 2018-03-05 RX ORDER — ATORVASTATIN CALCIUM 20 MG/1
TABLET, FILM COATED ORAL
Qty: 90 TABLET | Refills: 3 | Status: SHIPPED | OUTPATIENT
Start: 2018-03-05 | End: 2019-03-04 | Stop reason: SDUPTHER

## 2018-03-05 RX ORDER — HYDROCHLOROTHIAZIDE 25 MG/1
TABLET ORAL
Qty: 90 TABLET | Refills: 3 | Status: SHIPPED | OUTPATIENT
Start: 2018-03-05 | End: 2019-03-04 | Stop reason: SDUPTHER

## 2018-03-05 ASSESSMENT — PATIENT HEALTH QUESTIONNAIRE - PHQ9
2. FEELING DOWN, DEPRESSED OR HOPELESS: 0
SUM OF ALL RESPONSES TO PHQ9 QUESTIONS 1 & 2: 0
1. LITTLE INTEREST OR PLEASURE IN DOING THINGS: 0
SUM OF ALL RESPONSES TO PHQ QUESTIONS 1-9: 0

## 2018-03-05 NOTE — PROGRESS NOTES
Incorporated. Care instructions adapted under license by [facility] ([facility]). If you have questions about a medical condition or this instruction, always ask your healthcare professional. [address] any warranty or liability for your use of this information.

## 2018-04-02 DIAGNOSIS — Z95.818 STATUS POST PLACEMENT OF IMPLANTABLE LOOP RECORDER: Primary | ICD-10-CM

## 2018-04-02 DIAGNOSIS — I63.9 CRYPTOGENIC STROKE (HCC): ICD-10-CM

## 2018-04-02 PROCEDURE — 93298 REM INTERROG DEV EVAL SCRMS: CPT | Performed by: NURSE PRACTITIONER

## 2018-04-02 PROCEDURE — 93299 PR REM INTERROG ICPMS/SCRMS <30 D TECH REVIEW: CPT | Performed by: NURSE PRACTITIONER

## 2018-05-03 ENCOUNTER — OFFICE VISIT (OUTPATIENT)
Dept: CARDIOLOGY | Age: 53
End: 2018-05-03
Payer: COMMERCIAL

## 2018-05-03 VITALS
WEIGHT: 161 LBS | HEART RATE: 86 BPM | DIASTOLIC BLOOD PRESSURE: 82 MMHG | SYSTOLIC BLOOD PRESSURE: 132 MMHG | BODY MASS INDEX: 23.85 KG/M2 | HEIGHT: 69 IN

## 2018-05-03 DIAGNOSIS — Z95.818 STATUS POST PLACEMENT OF IMPLANTABLE LOOP RECORDER: ICD-10-CM

## 2018-05-03 DIAGNOSIS — I63.9 CRYPTOGENIC STROKE (HCC): Primary | ICD-10-CM

## 2018-05-03 DIAGNOSIS — I10 ESSENTIAL HYPERTENSION: ICD-10-CM

## 2018-05-03 PROCEDURE — 99213 OFFICE O/P EST LOW 20 MIN: CPT | Performed by: CLINICAL NURSE SPECIALIST

## 2018-05-03 PROCEDURE — 93291 INTERROG DEV EVAL SCRMS IP: CPT | Performed by: CLINICAL NURSE SPECIALIST

## 2018-05-03 ASSESSMENT — ENCOUNTER SYMPTOMS
COUGH: 0
HEARTBURN: 0
BLURRED VISION: 0
ORTHOPNEA: 0
NAUSEA: 0
SHORTNESS OF BREATH: 0

## 2018-06-07 DIAGNOSIS — Z95.818 STATUS POST PLACEMENT OF IMPLANTABLE LOOP RECORDER: Primary | ICD-10-CM

## 2018-06-07 DIAGNOSIS — I63.9 CRYPTOGENIC STROKE (HCC): ICD-10-CM

## 2018-06-07 PROCEDURE — 93299 PR REM INTERROG ICPMS/SCRMS <30 D TECH REVIEW: CPT | Performed by: CLINICAL NURSE SPECIALIST

## 2018-06-07 PROCEDURE — 93298 REM INTERROG DEV EVAL SCRMS: CPT | Performed by: CLINICAL NURSE SPECIALIST

## 2018-07-16 ENCOUNTER — OFFICE VISIT (OUTPATIENT)
Dept: PRIMARY CARE CLINIC | Age: 53
End: 2018-07-16
Payer: COMMERCIAL

## 2018-07-16 ENCOUNTER — HOSPITAL ENCOUNTER (OUTPATIENT)
Dept: WOMENS IMAGING | Age: 53
Discharge: HOME | End: 2018-07-16
Payer: COMMERCIAL

## 2018-07-16 VITALS
WEIGHT: 164.6 LBS | RESPIRATION RATE: 18 BRPM | TEMPERATURE: 98 F | BODY MASS INDEX: 24.38 KG/M2 | OXYGEN SATURATION: 98 % | DIASTOLIC BLOOD PRESSURE: 78 MMHG | SYSTOLIC BLOOD PRESSURE: 128 MMHG | HEART RATE: 80 BPM | HEIGHT: 69 IN

## 2018-07-16 DIAGNOSIS — L23.7 ALLERGIC DERMATITIS DUE TO POISON IVY: Primary | ICD-10-CM

## 2018-07-16 DIAGNOSIS — Z12.31 ENCOUNTER FOR SCREENING MAMMOGRAM FOR BREAST CANCER: ICD-10-CM

## 2018-07-16 PROCEDURE — 77063 BREAST TOMOSYNTHESIS BI: CPT

## 2018-07-16 PROCEDURE — 99213 OFFICE O/P EST LOW 20 MIN: CPT | Performed by: NURSE PRACTITIONER

## 2018-07-16 PROCEDURE — 96372 THER/PROPH/DIAG INJ SC/IM: CPT | Performed by: NURSE PRACTITIONER

## 2018-07-16 RX ORDER — PREDNISONE 10 MG/1
TABLET ORAL
Qty: 18 TABLET | Refills: 0 | Status: SHIPPED | OUTPATIENT
Start: 2018-07-16 | End: 2019-03-25 | Stop reason: ALTCHOICE

## 2018-07-16 RX ORDER — TRIAMCINOLONE ACETONIDE 40 MG/ML
40 INJECTION, SUSPENSION INTRA-ARTICULAR; INTRAMUSCULAR ONCE
Status: COMPLETED | OUTPATIENT
Start: 2018-07-16 | End: 2018-07-16

## 2018-07-16 RX ADMIN — TRIAMCINOLONE ACETONIDE 40 MG: 40 INJECTION, SUSPENSION INTRA-ARTICULAR; INTRAMUSCULAR at 17:27

## 2018-07-16 NOTE — PROGRESS NOTES
smoker, has stent    Heart Disease Sister         bypass, smoker    Thyroid Disease Sister         causes double vision    Heart Attack Sister     Elevated Lipids Sister     Thyroid Disease Sister     Elevated Lipids Sister     Elevated Lipids Sister     Heart Disease Sister         heart murmur    Other Brother         gb dz    Breast Cancer Paternal Cousin 48       Social History   Substance Use Topics    Smoking status: Never Smoker    Smokeless tobacco: Never Used    Alcohol use No      Current Outpatient Prescriptions   Medication Sig Dispense Refill    predniSONE (DELTASONE) 10 MG tablet Days 1-3 take 1 PO TID, days 4-6 take 1 PO BID, days 7-9 take 1 PO daily. 18 tablet 0    vitamin D (CHOLECALCIFEROL) 1000 UNIT TABS tablet Take 1,000 Units by mouth daily      atorvastatin (LIPITOR) 20 MG tablet 1 tablet by mouth at bedtime for high cholesterol 90 tablet 3    hydrochlorothiazide (HYDRODIURIL) 25 MG tablet 1 tablet by mouth once a day for fluid and blood pressure 90 tablet 3    albuterol sulfate HFA (PROVENTIL HFA) 108 (90 Base) MCG/ACT inhaler Inhale 2 puffs into the lungs every 6 hours as needed for Wheezing 1 Inhaler 1    aspirin 81 MG EC tablet Take 1 tablet by mouth daily 30 tablet 3    Omega-3 Fatty Acids (FISH OIL) 1200 MG CAPS Take by mouth daily      Clobetasol Prop Oint-Coal Tar (CLOBETAPLUS OINTMENT EX) Apply 1 Applicatorful topically as needed        No current facility-administered medications for this visit.       No Known Allergies    Health Maintenance   Topic Date Due    Shingles Vaccine (1 of 2 - 2 Dose Series) 06/03/2013    HIV screen  03/05/2020 (Originally 6/3/1980)    Flu vaccine (1) 09/01/2018    Potassium monitoring  03/05/2019    Creatinine monitoring  03/05/2019    Breast cancer screen  07/13/2019    Lipid screen  03/05/2023    Colon cancer screen colonoscopy  11/26/2023    DTaP/Tdap/Td vaccine (2 - Td) 03/05/2028    Hepatitis C screen  Completed Subjective:      Review of Systems   Constitutional: Negative for fever. Skin: Positive for rash. Objective:     Physical Exam   Constitutional: She is oriented to person, place, and time. She appears well-developed and well-nourished. HENT:   Head: Normocephalic. Right Ear: External ear normal.   Left Ear: External ear normal.   Eyes: Pupils are equal, round, and reactive to light. Neck: Normal range of motion. Cardiovascular: Normal rate, regular rhythm, normal heart sounds and intact distal pulses. Pulmonary/Chest: Effort normal and breath sounds normal.   Neurological: She is alert and oriented to person, place, and time. Skin: Skin is warm and dry. Psychiatric: She has a normal mood and affect. Her behavior is normal. Judgment and thought content normal.   Nursing note and vitals reviewed. /78 (Site: Left Arm, Position: Sitting, Cuff Size: Medium Adult)   Pulse 80   Temp 98 °F (36.7 °C) (Temporal)   Resp 18   Ht 5' 9\" (1.753 m)   Wt 164 lb 9.6 oz (74.7 kg)   LMP 08/24/2017   SpO2 98%   BMI 24.31 kg/m²     Assessment:       Diagnosis Orders   1. Allergic dermatitis due to poison ivy  triamcinolone acetonide (KENALOG-40) injection 40 mg       Plan:        Patient given educational materials - see patient instructions. Discussed use, benefit, and side effects of prescribed medications. All patient questions answered. Pt voiced understanding. Reviewed health maintenance. Instructed to continue current medications, diet and exercise. Patient agreed with treatment plan. Follow up as directed. MEDICATIONS:  Orders Placed This Encounter   Medications    predniSONE (DELTASONE) 10 MG tablet     Sig: Days 1-3 take 1 PO TID, days 4-6 take 1 PO BID, days 7-9 take 1 PO daily.      Dispense:  18 tablet     Refill:  0    triamcinolone acetonide (KENALOG-40) injection 40 mg         ORDERS:  No orders of the defined types were placed in this encounter. Follow-up:  No Follow-up on file. PATIENT INSTRUCTIONS:  There are no Patient Instructions on file for this visit. Electronically signed by FABIENNE Garcia CNP on 7/16/2018 at 6:04 PM    EMR Dragon/transcription disclaimer:  Much of this encounter note is electronic transcription/translation of spoken language to printed texts. The electronic translation of spoken language may be erroneous, or at times, nonsensical words or phrases may be inadvertently transcribed.   Although I have reviewed the note for such errors, some may still exist.

## 2018-07-19 DIAGNOSIS — Z95.818 STATUS POST PLACEMENT OF IMPLANTABLE LOOP RECORDER: Primary | ICD-10-CM

## 2018-07-19 DIAGNOSIS — I63.9 CRYPTOGENIC STROKE (HCC): ICD-10-CM

## 2018-07-19 PROCEDURE — 93298 REM INTERROG DEV EVAL SCRMS: CPT | Performed by: CLINICAL NURSE SPECIALIST

## 2018-07-19 PROCEDURE — 93299 PR REM INTERROG ICPMS/SCRMS <30 D TECH REVIEW: CPT | Performed by: CLINICAL NURSE SPECIALIST

## 2018-08-30 DIAGNOSIS — Z95.818 STATUS POST PLACEMENT OF IMPLANTABLE LOOP RECORDER: Primary | ICD-10-CM

## 2018-08-30 DIAGNOSIS — I63.9 CRYPTOGENIC STROKE (HCC): ICD-10-CM

## 2018-08-30 PROCEDURE — 93299 PR REM INTERROG ICPMS/SCRMS <30 D TECH REVIEW: CPT | Performed by: NURSE PRACTITIONER

## 2018-08-30 PROCEDURE — 93298 REM INTERROG DEV EVAL SCRMS: CPT | Performed by: NURSE PRACTITIONER

## 2018-10-10 DIAGNOSIS — Z95.818 STATUS POST PLACEMENT OF IMPLANTABLE LOOP RECORDER: Primary | ICD-10-CM

## 2018-10-10 DIAGNOSIS — I63.9 CRYPTOGENIC STROKE (HCC): ICD-10-CM

## 2018-10-10 PROCEDURE — 93299 PR REM INTERROG ICPMS/SCRMS <30 D TECH REVIEW: CPT | Performed by: CLINICAL NURSE SPECIALIST

## 2018-10-10 PROCEDURE — 93298 REM INTERROG DEV EVAL SCRMS: CPT | Performed by: CLINICAL NURSE SPECIALIST

## 2018-11-20 DIAGNOSIS — I63.9 CRYPTOGENIC STROKE (HCC): ICD-10-CM

## 2018-11-20 DIAGNOSIS — Z95.818 STATUS POST PLACEMENT OF IMPLANTABLE LOOP RECORDER: Primary | ICD-10-CM

## 2018-11-20 PROCEDURE — 93299 PR REM INTERROG ICPMS/SCRMS <30 D TECH REVIEW: CPT | Performed by: CLINICAL NURSE SPECIALIST

## 2018-11-20 PROCEDURE — 93298 REM INTERROG DEV EVAL SCRMS: CPT | Performed by: CLINICAL NURSE SPECIALIST

## 2019-01-02 DIAGNOSIS — Z95.818 STATUS POST PLACEMENT OF IMPLANTABLE LOOP RECORDER: Primary | ICD-10-CM

## 2019-01-02 DIAGNOSIS — I63.9 CRYPTOGENIC STROKE (HCC): ICD-10-CM

## 2019-01-02 PROCEDURE — 93299 PR REM INTERROG ICPMS/SCRMS <30 D TECH REVIEW: CPT | Performed by: NURSE PRACTITIONER

## 2019-01-02 PROCEDURE — 93298 REM INTERROG DEV EVAL SCRMS: CPT | Performed by: NURSE PRACTITIONER

## 2019-02-04 DIAGNOSIS — Z00.00 WELL ADULT EXAM: Primary | ICD-10-CM

## 2019-02-13 DIAGNOSIS — I63.9 CRYPTOGENIC STROKE (HCC): ICD-10-CM

## 2019-02-13 DIAGNOSIS — Z95.818 STATUS POST PLACEMENT OF IMPLANTABLE LOOP RECORDER: Primary | ICD-10-CM

## 2019-02-13 DIAGNOSIS — Z00.00 WELL ADULT EXAM: ICD-10-CM

## 2019-02-13 PROCEDURE — 93298 REM INTERROG DEV EVAL SCRMS: CPT | Performed by: CLINICAL NURSE SPECIALIST

## 2019-02-13 PROCEDURE — 93299 PR REM INTERROG ICPMS/SCRMS <30 D TECH REVIEW: CPT | Performed by: CLINICAL NURSE SPECIALIST

## 2019-03-04 RX ORDER — HYDROCHLOROTHIAZIDE 25 MG/1
TABLET ORAL
Qty: 90 TABLET | Refills: 3 | Status: SHIPPED | OUTPATIENT
Start: 2019-03-04 | End: 2020-03-09

## 2019-03-04 RX ORDER — ATORVASTATIN CALCIUM 20 MG/1
TABLET, FILM COATED ORAL
Qty: 90 TABLET | Refills: 3 | Status: SHIPPED | OUTPATIENT
Start: 2019-03-04 | End: 2019-03-25 | Stop reason: DRUGHIGH

## 2019-03-25 ENCOUNTER — OFFICE VISIT (OUTPATIENT)
Dept: PRIMARY CARE CLINIC | Age: 54
End: 2019-03-25
Payer: COMMERCIAL

## 2019-03-25 VITALS
WEIGHT: 170.4 LBS | OXYGEN SATURATION: 98 % | DIASTOLIC BLOOD PRESSURE: 82 MMHG | RESPIRATION RATE: 16 BRPM | HEART RATE: 73 BPM | SYSTOLIC BLOOD PRESSURE: 112 MMHG | TEMPERATURE: 97.8 F | BODY MASS INDEX: 25.82 KG/M2 | HEIGHT: 68 IN

## 2019-03-25 DIAGNOSIS — Z01.419 WELL FEMALE EXAM WITH ROUTINE GYNECOLOGICAL EXAM: Primary | ICD-10-CM

## 2019-03-25 DIAGNOSIS — Z12.31 ENCOUNTER FOR SCREENING MAMMOGRAM FOR BREAST CANCER: ICD-10-CM

## 2019-03-25 PROCEDURE — 99396 PREV VISIT EST AGE 40-64: CPT | Performed by: FAMILY MEDICINE

## 2019-03-25 RX ORDER — ATORVASTATIN CALCIUM 40 MG/1
TABLET, FILM COATED ORAL
Qty: 90 TABLET | Refills: 3 | Status: SHIPPED | OUTPATIENT
Start: 2019-03-25 | End: 2019-12-20

## 2019-03-25 ASSESSMENT — PATIENT HEALTH QUESTIONNAIRE - PHQ9
SUM OF ALL RESPONSES TO PHQ QUESTIONS 1-9: 0
1. LITTLE INTEREST OR PLEASURE IN DOING THINGS: 0
2. FEELING DOWN, DEPRESSED OR HOPELESS: 0
SUM OF ALL RESPONSES TO PHQ QUESTIONS 1-9: 0
SUM OF ALL RESPONSES TO PHQ9 QUESTIONS 1 & 2: 0

## 2019-03-27 DIAGNOSIS — I63.9 CRYPTOGENIC STROKE (HCC): ICD-10-CM

## 2019-03-27 DIAGNOSIS — Z95.818 STATUS POST PLACEMENT OF IMPLANTABLE LOOP RECORDER: Primary | ICD-10-CM

## 2019-03-27 PROCEDURE — 93298 REM INTERROG DEV EVAL SCRMS: CPT | Performed by: CLINICAL NURSE SPECIALIST

## 2019-03-27 PROCEDURE — 93299 PR REM INTERROG ICPMS/SCRMS <30 D TECH REVIEW: CPT | Performed by: CLINICAL NURSE SPECIALIST

## 2019-05-07 DIAGNOSIS — Z95.818 STATUS POST PLACEMENT OF IMPLANTABLE LOOP RECORDER: Primary | ICD-10-CM

## 2019-05-07 DIAGNOSIS — I63.9 CRYPTOGENIC STROKE (HCC): ICD-10-CM

## 2019-05-07 PROCEDURE — 93298 REM INTERROG DEV EVAL SCRMS: CPT | Performed by: NURSE PRACTITIONER

## 2019-05-07 PROCEDURE — 93299 PR REM INTERROG ICPMS/SCRMS <30 D TECH REVIEW: CPT | Performed by: NURSE PRACTITIONER

## 2019-06-18 DIAGNOSIS — Z95.818 STATUS POST PLACEMENT OF IMPLANTABLE LOOP RECORDER: Primary | ICD-10-CM

## 2019-06-18 DIAGNOSIS — I63.9 CRYPTOGENIC STROKE (HCC): ICD-10-CM

## 2019-06-18 PROCEDURE — 93299 PR REM INTERROG ICPMS/SCRMS <30 D TECH REVIEW: CPT | Performed by: NURSE PRACTITIONER

## 2019-06-18 PROCEDURE — 93298 REM INTERROG DEV EVAL SCRMS: CPT | Performed by: NURSE PRACTITIONER

## 2019-07-19 ENCOUNTER — HOSPITAL ENCOUNTER (OUTPATIENT)
Dept: WOMENS IMAGING | Age: 54
Discharge: HOME | End: 2019-07-19
Payer: COMMERCIAL

## 2019-07-19 DIAGNOSIS — Z12.31 ENCOUNTER FOR SCREENING MAMMOGRAM FOR BREAST CANCER: ICD-10-CM

## 2019-07-19 PROCEDURE — 77063 BREAST TOMOSYNTHESIS BI: CPT

## 2019-07-24 DIAGNOSIS — Z95.818 STATUS POST PLACEMENT OF IMPLANTABLE LOOP RECORDER: Primary | ICD-10-CM

## 2019-07-24 DIAGNOSIS — I63.9 CRYPTOGENIC STROKE (HCC): ICD-10-CM

## 2019-07-24 PROCEDURE — 93298 REM INTERROG DEV EVAL SCRMS: CPT | Performed by: NURSE PRACTITIONER

## 2019-07-24 PROCEDURE — 93299 PR REM INTERROG ICPMS/SCRMS <30 D TECH REVIEW: CPT | Performed by: NURSE PRACTITIONER

## 2019-09-04 DIAGNOSIS — Z95.818 STATUS POST PLACEMENT OF IMPLANTABLE LOOP RECORDER: Primary | ICD-10-CM

## 2019-09-04 DIAGNOSIS — I63.9 CRYPTOGENIC STROKE (HCC): ICD-10-CM

## 2019-09-04 PROCEDURE — 93298 REM INTERROG DEV EVAL SCRMS: CPT | Performed by: CLINICAL NURSE SPECIALIST

## 2019-09-04 PROCEDURE — 93299 PR REM INTERROG ICPMS/SCRMS <30 D TECH REVIEW: CPT | Performed by: CLINICAL NURSE SPECIALIST

## 2019-10-10 DIAGNOSIS — I63.9 CRYPTOGENIC STROKE (HCC): ICD-10-CM

## 2019-10-10 DIAGNOSIS — Z95.818 STATUS POST PLACEMENT OF IMPLANTABLE LOOP RECORDER: Primary | ICD-10-CM

## 2019-10-10 PROCEDURE — 93299 PR REM INTERROG ICPMS/SCRMS <30 D TECH REVIEW: CPT | Performed by: CLINICAL NURSE SPECIALIST

## 2019-10-10 PROCEDURE — 93298 REM INTERROG DEV EVAL SCRMS: CPT | Performed by: CLINICAL NURSE SPECIALIST

## 2019-11-21 DIAGNOSIS — Z95.818 STATUS POST PLACEMENT OF IMPLANTABLE LOOP RECORDER: Primary | ICD-10-CM

## 2019-11-21 DIAGNOSIS — I63.9 CRYPTOGENIC STROKE (HCC): ICD-10-CM

## 2019-11-21 PROCEDURE — 93298 REM INTERROG DEV EVAL SCRMS: CPT | Performed by: CLINICAL NURSE SPECIALIST

## 2019-11-21 PROCEDURE — 93299 PR REM INTERROG ICPMS/SCRMS <30 D TECH REVIEW: CPT | Performed by: CLINICAL NURSE SPECIALIST

## 2019-12-20 RX ORDER — ATORVASTATIN CALCIUM 40 MG/1
TABLET, FILM COATED ORAL
Qty: 90 TABLET | Refills: 3 | Status: SHIPPED | OUTPATIENT
Start: 2019-12-20 | End: 2020-03-29 | Stop reason: SDUPTHER

## 2020-01-08 PROCEDURE — G2066 INTER DEVC REMOTE 30D: HCPCS | Performed by: CLINICAL NURSE SPECIALIST

## 2020-01-08 PROCEDURE — 93298 REM INTERROG DEV EVAL SCRMS: CPT | Performed by: CLINICAL NURSE SPECIALIST

## 2020-02-10 PROCEDURE — 93298 REM INTERROG DEV EVAL SCRMS: CPT | Performed by: NURSE PRACTITIONER

## 2020-02-10 PROCEDURE — G2066 INTER DEVC REMOTE 30D: HCPCS | Performed by: NURSE PRACTITIONER

## 2020-03-12 PROCEDURE — G2066 INTER DEVC REMOTE 30D: HCPCS | Performed by: NURSE PRACTITIONER

## 2020-03-12 PROCEDURE — 93298 REM INTERROG DEV EVAL SCRMS: CPT | Performed by: NURSE PRACTITIONER

## 2020-03-27 ENCOUNTER — OFFICE VISIT (OUTPATIENT)
Dept: PRIMARY CARE CLINIC | Age: 55
End: 2020-03-27
Payer: COMMERCIAL

## 2020-03-27 VITALS
HEIGHT: 69 IN | BODY MASS INDEX: 25.09 KG/M2 | RESPIRATION RATE: 18 BRPM | HEART RATE: 82 BPM | DIASTOLIC BLOOD PRESSURE: 82 MMHG | TEMPERATURE: 98.2 F | OXYGEN SATURATION: 95 % | WEIGHT: 169.4 LBS | SYSTOLIC BLOOD PRESSURE: 128 MMHG

## 2020-03-27 LAB
ALBUMIN SERPL-MCNC: 4.6 G/DL (ref 3.5–5.2)
ALP BLD-CCNC: 79 U/L (ref 35–104)
ALT SERPL-CCNC: 25 U/L (ref 5–33)
ANION GAP SERPL CALCULATED.3IONS-SCNC: 15 MMOL/L (ref 7–19)
AST SERPL-CCNC: 25 U/L (ref 5–32)
BILIRUB SERPL-MCNC: 0.6 MG/DL (ref 0.2–1.2)
BUN BLDV-MCNC: 16 MG/DL (ref 6–20)
CALCIUM SERPL-MCNC: 9.7 MG/DL (ref 8.6–10)
CHLORIDE BLD-SCNC: 99 MMOL/L (ref 98–111)
CHOLESTEROL, TOTAL: 149 MG/DL (ref 160–199)
CO2: 29 MMOL/L (ref 22–29)
CREAT SERPL-MCNC: 0.7 MG/DL (ref 0.5–0.9)
GFR NON-AFRICAN AMERICAN: >60
GLUCOSE BLD-MCNC: 96 MG/DL (ref 74–109)
HCT VFR BLD CALC: 44.1 % (ref 37–47)
HDLC SERPL-MCNC: 58 MG/DL (ref 65–121)
HEMOGLOBIN: 14.4 G/DL (ref 12–16)
LDL CHOLESTEROL CALCULATED: 70 MG/DL
MCH RBC QN AUTO: 28.7 PG (ref 27–31)
MCHC RBC AUTO-ENTMCNC: 32.7 G/DL (ref 33–37)
MCV RBC AUTO: 88 FL (ref 81–99)
PDW BLD-RTO: 12.1 % (ref 11.5–14.5)
PLATELET # BLD: 282 K/UL (ref 130–400)
PMV BLD AUTO: 10.5 FL (ref 9.4–12.3)
POTASSIUM SERPL-SCNC: 4 MMOL/L (ref 3.5–5)
RBC # BLD: 5.01 M/UL (ref 4.2–5.4)
SODIUM BLD-SCNC: 143 MMOL/L (ref 136–145)
TOTAL PROTEIN: 7.8 G/DL (ref 6.6–8.7)
TRIGL SERPL-MCNC: 104 MG/DL (ref 0–149)
WBC # BLD: 7.2 K/UL (ref 4.8–10.8)

## 2020-03-27 PROCEDURE — 99396 PREV VISIT EST AGE 40-64: CPT | Performed by: FAMILY MEDICINE

## 2020-03-27 PROCEDURE — 36415 COLL VENOUS BLD VENIPUNCTURE: CPT | Performed by: FAMILY MEDICINE

## 2020-03-27 ASSESSMENT — PATIENT HEALTH QUESTIONNAIRE - PHQ9
SUM OF ALL RESPONSES TO PHQ QUESTIONS 1-9: 0
SUM OF ALL RESPONSES TO PHQ QUESTIONS 1-9: 0
SUM OF ALL RESPONSES TO PHQ9 QUESTIONS 1 & 2: 0
2. FEELING DOWN, DEPRESSED OR HOPELESS: 0
1. LITTLE INTEREST OR PLEASURE IN DOING THINGS: 0

## 2020-03-27 NOTE — PROGRESS NOTES
SUBJECTIVE:   62 y.o. female for annual routine Pap and checkup. She does not need a Pap smear since she had a complete hysterectomy done by Dr. Julianne Mina. He follows her pelvic exams but we do her breast exams. Overall she is feeling good. She's had no further strokelike symptoms. She denies depression. Her son is in [facility] with the [employer]. She denies muscle pain from her Lipitor. She is still taking her HCTZ  Patient Active Problem List    Diagnosis Date Noted    Cryptogenic stroke ([facility] 12/03/2016    Status post placement of implantable loop recorder 11/23/2016    Essential hypertension 11/21/2016    Aphasia 11/20/2016    Hyperlipidemia 11/01/2013     Current Outpatient Medications   Medication Sig Dispense Refill    atorvastatin (LIPITOR) 40 MG tablet TAKE ONE TABLET BY MOUTH DAILY FOR HIGH CHOLESTEROL 90 tablet 3    hydroCHLOROthiazide (HYDRODIURIL) 25 MG tablet One tablet per day. 30 tablet 5    vitamin D (CHOLECALCIFEROL) 1000 UNIT TABS tablet Take 1,000 Units by mouth daily      albuterol sulfate HFA (PROVENTIL HFA) 108 (90 Base) MCG/ACT inhaler Inhale 2 puffs into the lungs every 6 hours as needed for Wheezing 1 Inhaler 1    aspirin 81 MG EC tablet Take 1 tablet by mouth daily 30 tablet 3    Omega-3 Fatty Acids (FISH OIL) 1200 MG CAPS Take by mouth daily      Clobetasol Prop Oint-Coal Tar (CLOBETAPLUS OINTMENT EX) Apply 1 Applicatorful topically as needed        No current facility-administered medications for this visit.       Past Medical History:   Diagnosis Date    Bronchitis     [facility]     Cryptogenic stroke ([facility] 11/23/2016    Dr. Janine Silverman    Hyperlipidemia     Hypertension     Status post placement of implantable loop recorder 11/23/2016    Dr Lyric Proctor     Past Surgical History:   Procedure Laterality Date    BREAST ENHANCEMENT SURGERY  1988    BREAST SURGERY      mass removal-right    COLONOSCOPY      VICKY AND BSO      cervix removed    TUBAL LIGATION       Family History   Problem Relation Age of Onset    Heart Disease Mother         cardiomyopathy, pacemaker    Hypertension Mother     Cancer Mother         non-hodgkins lymphoma in LL lobe, skin cancer    Other Mother         gb dz, phlebitis    Heart Disease Father     Lung Cancer Father     Heart Attack Brother         smoker, has stent    Heart Disease Sister         bypass, smoker    Thyroid Disease Sister         causes double vision    Heart Attack Sister     Elevated Lipids Sister     Thyroid Disease Sister     Elevated Lipids Sister     Elevated Lipids Sister     Heart Disease Sister         heart murmur    Other Brother         gb dz    Breast Cancer Paternal Cousin 48     Social History     Tobacco Use    Smoking status: Never Smoker    Smokeless tobacco: Never Used   Substance Use Topics    Alcohol use: No       Allergies: Poison ivy extract  Patient's last menstrual period was 08/24/2017. ROS:  Feeling well. No dyspnea or chest pain on exertion. No abdominal pain, change in bowel habits, black or bloody stools. No urinary tract symptoms. GYN ROS: none   No neurological complaints. OBJECTIVE:   The patient appears well, alert, oriented x 3, in no distress. /82   Pulse 82   Temp 98.2 °F (36.8 °C) (Temporal)   Resp 18   Ht 5' 9\" (1.753 m)   Wt 169 lb 6.4 oz (76.8 kg)   LMP 08/24/2017   SpO2 95%   BMI 25.02 kg/m²   Skin normal, no suspicious skin lesions. ENT normal.  Neck supple. No adenopathy or thyromegaly. RAVEN. Lungs are clear, good air entry, no wheezes, rhonchi or rales. S1 and S2 normal, no murmurs, regular rate and rhythm. Abdomen soft without tenderness, guarding, mass or organomegaly. Extremities show no edema, normal peripheral pulses. Neurological is normal, no focal findings. Psychiatric exam, no signs of depression. BREAST EXAM: Breasts symmetrical. No skin lesions. Nipples appear normal without discharge. No masses or axillary lymphadenopathy.  No tenderness. PELVIC EXAM: Dr. Idalia Elena does this in [place]. ASSESSMENT:   1. Well female exam with routine gynecological exam    2. Encounter for screening mammogram for breast cancer        PLAN:   MEDICATIONS:  No orders of the defined types were placed in this encounter. Continue current medications. ORDERS:  Orders Placed This Encounter   Procedures    RICHELLE DIGITAL SCREEN W CAD BILATERAL    Comprehensive Metabolic Panel    Lipid Panel    CBC      mammogram will be scheduled. We will contact her with results of her blood work. We definitely needed to draw her labs because she is on HCTZ and we need to make sure that her potassium and kidney function have not been affected. Follow-up:  Return in about 1 year (around 3/27/2021) for Physical and fasting blood work. PATIENT INSTRUCTIONS:  Patient Instructions   We are committed to providing you with the best care possible. In order to help us achieve these goals please remember to bring all medications, herbal products, and over the counter supplements with you to each visit. If your provider has ordered testing for you, please be sure to follow up with our office if you have not received results within 7 days after the testing took place. *If you receive a survey after visiting one of our offices, please take time to share your experience concerning your physician office visit. These surveys are confidential and no health information about you is shared. We are eager to improve for you and we are counting on your feedback to help make that happen. EMR Dragon/transcription disclaimer:  Much of this encounter note is electronic transcription/translation of spoken language to printed texts. The electronic translation of spoken language may be erroneous, or at times, nonsensical words or phrases may be inadvertently transcribed.   Although I have reviewed the note for such errors, some may still exist.

## 2020-03-30 RX ORDER — ATORVASTATIN CALCIUM 40 MG/1
TABLET, FILM COATED ORAL
Qty: 90 TABLET | Refills: 3 | Status: SHIPPED | OUTPATIENT
Start: 2020-03-30 | End: 2020-06-10 | Stop reason: SDUPTHER

## 2020-03-30 RX ORDER — HYDROCHLOROTHIAZIDE 25 MG/1
TABLET ORAL
Qty: 30 TABLET | Refills: 5 | Status: SHIPPED | OUTPATIENT
Start: 2020-03-30 | End: 2020-06-10 | Stop reason: SDUPTHER

## 2020-04-13 PROCEDURE — 93298 REM INTERROG DEV EVAL SCRMS: CPT | Performed by: NURSE PRACTITIONER

## 2020-04-13 PROCEDURE — G2066 INTER DEVC REMOTE 30D: HCPCS | Performed by: NURSE PRACTITIONER

## 2020-05-14 PROCEDURE — G2066 INTER DEVC REMOTE 30D: HCPCS | Performed by: NURSE PRACTITIONER

## 2020-05-14 PROCEDURE — 93298 REM INTERROG DEV EVAL SCRMS: CPT | Performed by: NURSE PRACTITIONER

## 2020-05-26 ENCOUNTER — OFFICE VISIT (OUTPATIENT)
Dept: FAMILY MEDICINE CLINIC | Age: 55
End: 2020-05-26
Payer: COMMERCIAL

## 2020-05-26 VITALS
OXYGEN SATURATION: 97 % | WEIGHT: 171 LBS | BODY MASS INDEX: 25.25 KG/M2 | TEMPERATURE: 97.9 F | HEART RATE: 76 BPM | DIASTOLIC BLOOD PRESSURE: 72 MMHG | SYSTOLIC BLOOD PRESSURE: 120 MMHG

## 2020-05-26 PROCEDURE — 99213 OFFICE O/P EST LOW 20 MIN: CPT | Performed by: FAMILY MEDICINE

## 2020-05-26 RX ORDER — MULTIVIT WITH MINERALS/LUTEIN
250 TABLET ORAL DAILY
COMMUNITY

## 2020-05-26 ASSESSMENT — ENCOUNTER SYMPTOMS
EYES NEGATIVE: 1
RESPIRATORY NEGATIVE: 1
GASTROINTESTINAL NEGATIVE: 1
ALLERGIC/IMMUNOLOGIC NEGATIVE: 1

## 2020-05-26 NOTE — PROGRESS NOTES
SUBJECTIVE:    Patient ID: Radha Justin is a 62 y. o.female. HPI:   Patient here to establish care  Patient is a 53-year-old white female. She is switching providers secondary to insurance requirements. She have hypertension hyperlipidemia. She had previous history of strokes. She have no recurrence of the stroke. She takes statin therapy. She also takes blood pressure medicine. Blood pressures well controlled. She have no complaints today. She have history of uterine cancer and see oncologist in [facility].  She needed referral to see him again. He been seen him twice yearly for the last couple of years. sHe needs a prescription for a mammogram.  She have no health concerns today. Past Medical History:   Diagnosis Date    Bronchitis     Cancer [facility])     uterine     [facility] ([facility] 11/23/2016    Dr. Amy Tapia    Fracture     right arm    Hyperlipidemia     Hypertension     Status post placement of implantable loop recorder 11/23/2016    Dr Biswas Place     Current Outpatient Medications on File Prior to Visit   Medication Sig Dispense Refill    ZINC PO Take by mouth      Ascorbic Acid (VITAMIN C) 250 MG tablet Take 250 mg by mouth daily      atorvastatin (LIPITOR) 40 MG tablet TAKE ONE TABLET BY MOUTH DAILY FOR HIGH CHOLESTEROL 90 tablet 3    hydroCHLOROthiazide (HYDRODIURIL) 25 MG tablet One tablet per day. 30 tablet 5    vitamin D (CHOLECALCIFEROL) 1000 UNIT TABS tablet Take 1,000 Units by mouth daily      aspirin 81 MG EC tablet Take 1 tablet by mouth daily 30 tablet 3    Omega-3 Fatty Acids (FISH OIL) 1200 MG CAPS Take by mouth daily      Clobetasol Prop Oint-Coal Tar (CLOBETAPLUS OINTMENT EX) Apply 1 Applicatorful topically as needed        No current facility-administered medications on file prior to visit.       Allergies   Allergen Reactions    Poison Ivy Extract Rash     Past Surgical History:   Procedure Laterality Date    BREAST ENHANCEMENT SURGERY  1988    BREAST SURGERY      mass removal-right    COLONOSCOPY      VICKY AND BSO      cervix removed    TUBAL LIGATION       Family History   Problem Relation Age of Onset    Heart Disease Mother         cardiomyopathy, pacemaker    Hypertension Mother     Cancer Mother         non-hodgkins lymphoma in LL lobe, skin cancer    Other Mother         gb dz, phlebitis    Heart Disease Father     Lung Cancer Father     Heart Attack Brother         smoker, has stent    Heart Disease Sister         bypass, smoker    Thyroid Disease Sister         causes double vision    Heart Attack Sister     Elevated Lipids Sister     Thyroid Disease Sister     Elevated Lipids Sister     Elevated Lipids Sister     Heart Disease Sister         heart murmur    Other Brother         gb dz    Breast Cancer Paternal Cousin 48     Social History     Socioeconomic History    Marital status:      Spouse name: Not on file    Number of children: 2    Years of education: Not on file    Highest education level: Not on file   Occupational History    Occupation: [employer]    Financial resource strain: Not on file    Food insecurity     Worry: Not on file     Inability: Not on file   [employer] needs     Medical: Not on file     Non-medical: Not on file   Tobacco Use    Smoking status: Never Smoker    Smokeless tobacco: Never Used   Substance and Sexual Activity    Alcohol use: No    Drug use: No    Sexual activity: Yes     Partners: Male   Lifestyle    Physical activity     Days per week: Not on file     Minutes per session: Not on file    Stress: Not on file   Relationships    Social connections     Talks on phone: Not on file     Gets together: Not on file     Attends Yazdanism service: Not on file     Active member of club or organization: Not on file     Attends meetings of clubs or organizations: Not on file     Relationship status: Not on file    Intimate partner violence     Fear of current or ex partner: Not on file     Emotionally abused: Not on file     Physically abused: Not on file     Forced sexual activity: Not on file   Other Topics Concern    Not on file   Social History Narrative    Not on file        Review of Systems   Constitutional: Negative. HENT: Negative. Eyes: Negative. Respiratory: Negative. Cardiovascular: Negative. Gastrointestinal: Negative. Endocrine: Negative. Genitourinary: Negative. Musculoskeletal: Negative. Skin: Negative. Allergic/Immunologic: Negative. Neurological: Negative. Hematological: Negative. Psychiatric/Behavioral: Negative. OBJECTIVE:    Physical Exam  Constitutional:       Appearance: Normal appearance. She is well-developed and well-groomed. HENT:      Head: Normocephalic and atraumatic. Right Ear: Tympanic membrane, ear canal and external ear normal. There is no impacted cerumen. Left Ear: Tympanic membrane, ear canal and external ear normal. There is no impacted cerumen. Nose: Nose normal.      Mouth/Throat:      Lips: Pink. Mouth: Mucous membranes are moist.      Dentition: Normal dentition. Pharynx: Oropharynx is clear. Uvula midline. Eyes:      General: Lids are normal.         Right eye: No discharge. Left eye: No discharge. Extraocular Movements: Extraocular movements intact. Conjunctiva/sclera: Conjunctivae normal.      Right eye: Right conjunctiva is not injected. Left eye: Left conjunctiva is not injected. Pupils: Pupils are equal, round, and reactive to light. Neck:      Musculoskeletal: Full passive range of motion without pain, normal range of motion and neck supple. Thyroid: No thyromegaly. Vascular: No carotid bruit or JVD. Cardiovascular:      Rate and Rhythm: Normal rate and regular rhythm. Pulses: Normal pulses. Carotid pulses are 2+ on the right side and 2+ on the left side.        Radial pulses are 2+ on the right side and 2+ on the left side. Heart sounds: Normal heart sounds, S1 normal and S2 normal. No murmur. Pulmonary:      Effort: Pulmonary effort is normal.      Breath sounds: Normal breath sounds. Abdominal:      General: Bowel sounds are normal. There is no distension or abdominal bruit. Palpations: Abdomen is soft. There is no mass. Hernia: No hernia is present. Musculoskeletal: Normal range of motion. Right lower leg: No edema. Left lower leg: No edema. Lymphadenopathy:      Cervical: No cervical adenopathy. Right cervical: No superficial cervical adenopathy. Left cervical: No superficial cervical adenopathy. Upper Body:      Right upper body: No supraclavicular adenopathy. Left upper body: No supraclavicular adenopathy. Skin:     General: Skin is warm and dry. Coloration: Skin is not pale. Findings: No lesion or rash. Nails: There is no clubbing. Neurological:      Mental Status: She is alert and oriented to person, place, and time. Motor: No weakness or tremor. Coordination: Coordination normal.      Deep Tendon Reflexes: Reflexes are normal and symmetric. Psychiatric:         Attention and Perception: Attention normal.         Mood and Affect: Mood normal.         Speech: Speech normal.         Behavior: Behavior normal. Behavior is cooperative. Thought Content: Thought content normal.         Cognition and Memory: Cognition and memory normal.         Judgment: Judgment normal.        /72 (Site: Left Upper Arm, Position: Sitting, Cuff Size: Medium Adult)   Pulse 76   Temp 97.9 °F (36.6 °C) (Temporal)   Wt 171 lb (77.6 kg)   LMP 08/24/2017   SpO2 97%   BMI 25.25 kg/m²      ASSESSMENT:     Diagnosis Orders   1. Encounter for medical examination to establish care     2. Essential hypertension is well controlled CBC    Comprehensive Metabolic Panel    Lipid Panel    Urinalysis    TSH without Reflex   3. Hyperlipidemia, unspecified hyperlipidemia type-stable    4. Malignant neoplasm of uterus, unspecified site (HCC)-stable External Referral To Gynecologic Oncology   5. Screening mammogram, encounter for  [facility] DIGITAL SCREENING AUGMENTED BILATERAL        PLAN:    1. Will assume care. 2.  Continue medications. 3.  Continue medications. 4.  Refer to GYN oncology.   5.  Mammography  Follow-up in 6 months

## 2020-06-10 RX ORDER — HYDROCHLOROTHIAZIDE 25 MG/1
TABLET ORAL
Qty: 90 TABLET | Refills: 3 | Status: SHIPPED | OUTPATIENT
Start: 2020-06-10 | End: 2021-06-10 | Stop reason: SDUPTHER

## 2020-06-10 RX ORDER — ATORVASTATIN CALCIUM 40 MG/1
TABLET, FILM COATED ORAL
Qty: 90 TABLET | Refills: 3 | Status: SHIPPED | OUTPATIENT
Start: 2020-06-10 | End: 2021-06-10 | Stop reason: SDUPTHER

## 2020-06-17 PROCEDURE — 93298 REM INTERROG DEV EVAL SCRMS: CPT | Performed by: NURSE PRACTITIONER

## 2020-06-17 PROCEDURE — G2066 INTER DEVC REMOTE 30D: HCPCS | Performed by: NURSE PRACTITIONER

## 2020-07-20 ENCOUNTER — HOSPITAL ENCOUNTER (OUTPATIENT)
Dept: WOMENS IMAGING | Age: 55
Discharge: HOME | End: 2020-07-20
Payer: OTHER GOVERNMENT

## 2020-07-20 PROCEDURE — 77067 SCR MAMMO BI INCL CAD: CPT

## 2020-08-20 ENCOUNTER — TELEPHONE (OUTPATIENT)
Dept: CARDIOLOGY | Age: 55
End: 2020-08-20

## 2020-08-25 ENCOUNTER — TELEPHONE (OUTPATIENT)
Dept: CARDIOLOGY | Age: 55
End: 2020-08-25

## 2020-08-25 NOTE — TELEPHONE ENCOUNTER
Alejandra  Patient is wanting loop recorder removed. I don't see a current office visit. Last one 2018. She will need to be scheduled to come in to get all that set up.   Thanks, Aime Schmidt

## 2020-08-25 NOTE — TELEPHONE ENCOUNTER
Patient called and stated she would like to schedule to have her loop recorder removed. He battery reached RRT on 5/6/20. She was a former patient of Dr. Kamila Reynolds.   Please refer patient to cardiologist for loop recorder removal.

## 2020-08-26 ENCOUNTER — OFFICE VISIT (OUTPATIENT)
Dept: FAMILY MEDICINE CLINIC | Age: 55
End: 2020-08-26
Payer: OTHER GOVERNMENT

## 2020-08-26 ENCOUNTER — HOSPITAL ENCOUNTER (OUTPATIENT)
Dept: GENERAL RADIOLOGY | Age: 55
Discharge: HOME | End: 2020-08-26
Payer: OTHER GOVERNMENT

## 2020-08-26 VITALS
BODY MASS INDEX: 25.4 KG/M2 | TEMPERATURE: 97.1 F | WEIGHT: 172 LBS | DIASTOLIC BLOOD PRESSURE: 82 MMHG | OXYGEN SATURATION: 98 % | HEART RATE: 70 BPM | SYSTOLIC BLOOD PRESSURE: 132 MMHG

## 2020-08-26 DIAGNOSIS — R07.9 CHEST PAIN, UNSPECIFIED TYPE: ICD-10-CM

## 2020-08-26 LAB
ALBUMIN SERPL-MCNC: 4.5 G/DL (ref 3.5–5.2)
ALP BLD-CCNC: 87 U/L (ref 35–104)
ALT SERPL-CCNC: 28 U/L (ref 5–33)
ANION GAP SERPL CALCULATED.3IONS-SCNC: 13 MMOL/L (ref 7–19)
AST SERPL-CCNC: 31 U/L (ref 5–32)
BILIRUB SERPL-MCNC: 0.4 MG/DL (ref 0.2–1.2)
BUN BLDV-MCNC: 12 MG/DL (ref 6–20)
CALCIUM SERPL-MCNC: 9.5 MG/DL (ref 8.6–10)
CHLORIDE BLD-SCNC: 102 MMOL/L (ref 98–111)
CO2: 28 MMOL/L (ref 22–29)
CREAT SERPL-MCNC: 0.7 MG/DL (ref 0.5–0.9)
GFR AFRICAN AMERICAN: >59
GFR NON-AFRICAN AMERICAN: >60
GLUCOSE BLD-MCNC: 105 MG/DL (ref 74–109)
HCT VFR BLD CALC: 39.5 % (ref 37–47)
HEMOGLOBIN: 13.2 G/DL (ref 12–16)
MCH RBC QN AUTO: 29.4 PG (ref 27–31)
MCHC RBC AUTO-ENTMCNC: 33.4 G/DL (ref 33–37)
MCV RBC AUTO: 88 FL (ref 81–99)
PDW BLD-RTO: 12.7 % (ref 11.5–14.5)
PLATELET # BLD: 247 K/UL (ref 130–400)
PMV BLD AUTO: 10.8 FL (ref 9.4–12.3)
POTASSIUM SERPL-SCNC: 3.9 MMOL/L (ref 3.5–5)
RBC # BLD: 4.49 M/UL (ref 4.2–5.4)
SODIUM BLD-SCNC: 143 MMOL/L (ref 136–145)
TOTAL PROTEIN: 7.2 G/DL (ref 6.6–8.7)
WBC # BLD: 8.3 K/UL (ref 4.8–10.8)

## 2020-08-26 PROCEDURE — 71046 X-RAY EXAM CHEST 2 VIEWS: CPT

## 2020-08-26 PROCEDURE — 99214 OFFICE O/P EST MOD 30 MIN: CPT | Performed by: FAMILY MEDICINE

## 2020-08-26 PROCEDURE — 93000 ELECTROCARDIOGRAM COMPLETE: CPT | Performed by: FAMILY MEDICINE

## 2020-08-26 RX ORDER — OMEPRAZOLE 40 MG/1
40 CAPSULE, DELAYED RELEASE ORAL DAILY
Qty: 30 CAPSULE | Refills: 3 | Status: SHIPPED | OUTPATIENT
Start: 2020-08-26 | End: 2021-06-14 | Stop reason: ALTCHOICE

## 2020-08-26 ASSESSMENT — ENCOUNTER SYMPTOMS
CHEST TIGHTNESS: 1
EYES NEGATIVE: 1
GASTROINTESTINAL NEGATIVE: 1
ALLERGIC/IMMUNOLOGIC NEGATIVE: 1

## 2020-08-26 NOTE — PROGRESS NOTES
Genitourinary: Negative. Musculoskeletal: Negative. Skin: Negative. Allergic/Immunologic: Negative. Neurological: Negative. Hematological: Negative. Psychiatric/Behavioral: Negative. OBJECTIVE:    Physical Exam  Constitutional:       Appearance: Normal appearance. She is well-developed and well-groomed. HENT:      Head: Normocephalic and atraumatic. Right Ear: Tympanic membrane, ear canal and external ear normal. There is no impacted cerumen. Left Ear: Tympanic membrane, ear canal and external ear normal. There is no impacted cerumen. Nose: Nose normal.      Mouth/Throat:      Lips: Pink. Mouth: Mucous membranes are moist.      Dentition: Normal dentition. Pharynx: Oropharynx is clear. Uvula midline. Eyes:      General: Lids are normal.         Right eye: No discharge. Left eye: No discharge. Extraocular Movements: Extraocular movements intact. Conjunctiva/sclera: Conjunctivae normal.      Right eye: Right conjunctiva is not injected. Left eye: Left conjunctiva is not injected. Pupils: Pupils are equal, round, and reactive to light. Neck:      Musculoskeletal: Full passive range of motion without pain, normal range of motion and neck supple. Thyroid: No thyromegaly. Vascular: No carotid bruit or JVD. Cardiovascular:      Rate and Rhythm: Normal rate and regular rhythm. Pulses: Normal pulses. Carotid pulses are 2+ on the right side and 2+ on the left side. Radial pulses are 2+ on the right side and 2+ on the left side. Heart sounds: Normal heart sounds, S1 normal and S2 normal. No murmur. Pulmonary:      Effort: Pulmonary effort is normal.      Breath sounds: Normal breath sounds. Abdominal:      General: Bowel sounds are normal. There is no distension or abdominal bruit. Palpations: Abdomen is soft. There is no mass. Hernia: No hernia is present.    Musculoskeletal: Normal range of motion. Right lower leg: No edema. Left lower leg: No edema. Lymphadenopathy:      Cervical: No cervical adenopathy. Right cervical: No superficial cervical adenopathy. Left cervical: No superficial cervical adenopathy. Upper Body:      Right upper body: No supraclavicular adenopathy. Left upper body: No supraclavicular adenopathy. Skin:     General: Skin is warm and dry. Coloration: Skin is not pale. Findings: No lesion or rash. Nails: There is no clubbing. Neurological:      Mental Status: She is alert and oriented to person, place, and time. Motor: No weakness or tremor. Coordination: Coordination normal.      Deep Tendon Reflexes: Reflexes are normal and symmetric. Psychiatric:         Attention and Perception: Attention normal.         Mood and Affect: Mood normal.         Speech: Speech normal.         Behavior: Behavior normal. Behavior is cooperative. Thought Content: Thought content normal.         Cognition and Memory: Cognition and memory normal.         Judgment: Judgment normal.        /82   Pulse 70   Temp 97.1 °F (36.2 °C)   Wt 172 lb (78 kg)   LMP 08/24/2017   SpO2 98%   BMI 25.40 kg/m²      ASSESSMENT:     Diagnosis Orders   1. Chest pain, unspecified type cardiac versus GI versus pulmonary EKG 12 Lead    Echocardiogram stress test    CBC    Comprehensive Metabolic Panel    XR CHEST STANDARD (2 VW)    omeprazole (PRILOSEC) 40 MG delayed release capsule   2. Encounter for loop recorder at end of battery life  Allen Bhandari MD, Cardiology, [place]        PLAN:    1. Blood work. EKG shows normal sinus rhythm without evidence of acute ischemia. Stress test.  Chest x-ray. 2.  Refer to cardiology.   Follow-up after test.

## 2020-09-21 ENCOUNTER — HOSPITAL ENCOUNTER (OUTPATIENT)
Dept: NON INVASIVE DIAGNOSTICS | Age: 55
Discharge: HOME | End: 2020-09-21
Payer: OTHER GOVERNMENT

## 2020-09-21 LAB
LV EF: 50 %
LVEF MODALITY: NORMAL

## 2020-09-21 PROCEDURE — 93350 STRESS TTE ONLY: CPT

## 2020-09-21 PROCEDURE — 93017 CV STRESS TEST TRACING ONLY: CPT

## 2020-09-22 ENCOUNTER — TELEPHONE (OUTPATIENT)
Dept: CARDIOLOGY | Age: 55
End: 2020-09-22

## 2020-09-22 NOTE — TELEPHONE ENCOUNTER
NP; Maliha Sheppard \"new\" Referral; Z45.09 (ICD-10-CM) - Encounter for loop recorder at end of battery life; R07.9 (ICD-10-CM) - Chest pain, unspecified type. Called to reschedule 10/06 apt with Lorin Vargas, spoke with pts  & he will give her message to return call & reschedule.

## 2020-09-23 ENCOUNTER — TELEPHONE (OUTPATIENT)
Dept: CARDIOLOGY | Age: 55
End: 2020-09-23

## 2020-09-23 NOTE — TELEPHONE ENCOUNTER
Called to reschedule 10/06 apt with [facility], left message for pt to return call & reschedule.

## 2020-09-30 ENCOUNTER — OFFICE VISIT (OUTPATIENT)
Dept: CARDIOLOGY | Age: 55
End: 2020-09-30
Payer: OTHER GOVERNMENT

## 2020-09-30 VITALS
OXYGEN SATURATION: 98 % | BODY MASS INDEX: 25.62 KG/M2 | DIASTOLIC BLOOD PRESSURE: 84 MMHG | HEART RATE: 81 BPM | HEIGHT: 69 IN | WEIGHT: 173 LBS | SYSTOLIC BLOOD PRESSURE: 130 MMHG

## 2020-09-30 PROCEDURE — 99213 OFFICE O/P EST LOW 20 MIN: CPT | Performed by: NURSE PRACTITIONER

## 2020-09-30 NOTE — PATIENT INSTRUCTIONS
Loop recorder Scheduled on 10/19.  Arrive at 6:30 am  Get Covid Testing done on 10/15 at [facility]

## 2020-09-30 NOTE — PROGRESS NOTES
Musculoskeletal: Negative for myalgias, arthralgias and gait problem. Skin: Negative for color change, pallor, rash and wound. Neurological: Negative for dizziness, tremors, speech difficulty, weakness and numbness. Hematological: Does not bruise/bleed easily. Psychiatric/Behavioral: Negative.         Past Medical History:   Diagnosis Date    Bronchitis     Cancer [facility])     uterine     Cryptogenic stroke ([address] 11/23/2016    Dr. Mathews Scales    Fracture     right arm    Hyperlipidemia     Hypertension     Status post placement of implantable loop recorder 11/23/2016    Dr Kelly Miller       Past Surgical History:   Procedure Laterality Date    BREAST ENHANCEMENT SURGERY  1988    BREAST SURGERY      mass removal-right    COLONOSCOPY      VICKY AND BSO      cervix removed    TUBAL LIGATION         Family History   Problem Relation Age of Onset    Heart Disease Mother         cardiomyopathy, pacemaker    Hypertension Mother     Cancer Mother         non-hodgkins lymphoma in LL lobe, skin cancer    Other Mother         gb dz, phlebitis    Heart Disease Father     Lung Cancer Father     Heart Attack Brother         smoker, has stent    Heart Disease Sister         bypass, smoker    Thyroid Disease Sister         causes double vision    Heart Attack Sister     Elevated Lipids Sister     Thyroid Disease Sister     Elevated Lipids Sister     Elevated Lipids Sister     Heart Disease Sister         heart murmur    Other Brother         gb dz    Breast Cancer Paternal Cousin 48       Social History     Socioeconomic History    Marital status:      Spouse name: Not on file    Number of children: 2    Years of education: Not on file    Highest education level: Not on file   Occupational History    Occupation: [employer]   Social Needs    Financial resource strain: Not on file    Food insecurity     Worry: Not on file     Inability: Not on file    Transportation needs     Medical: Not on Estimated body mass index is 25.55 kg/m² as calculated from the following:    Height as of this encounter: 5' 9\" (1.753 m). Weight as of this encounter: 173 lb (78.5 kg). Constitutional: She is oriented to person, place, and time. She appears well-developed and well-nourished in no acute distress. Neck:  Neck supple without JVD present. No trachea deviation present. No thyromegaly present. Eyes:Conjunctivae and EOM are normal. Pupils equal and reactive to light. ENT:Hearing appears normal.conjunctiva and lids are normal, ears and nose appear normal.  Cardiovascular: Normal rate, Normal rhythm, normal heart sounds. No murmur ascultated. No gallop and no friction rub. No carotid bruits. No peripheral edema. Pulmonary/Chest:  Lungs clear to auscultation bilaterally without evidence of respiratory distress. She without wheezes. She without rales or ronchi. Musculoskeletal: Normal range of motion. Gait is normal. Head is normocephalic and atraumatic. Skin: Skin is warm and dry without rash or pallor. Psychiatric:She is alert and oriented to person, place, and time. She has a normal mood and affect. Her behavior is normal. Thought content normal.       Lab Results   Component Value Date    CREATININE 0.7 08/26/2020    CREATININE 0.7 03/27/2020    CREATININE 0.7 03/05/2018    HGB 13.2 08/26/2020    HGB 14.4 03/27/2020    HGB 12.4 03/05/2018         Stress Echo- 9/21/20  Stress echocardiogram without clinical, electrocardiographic, or   echocardiographic evidence of myocardial ischemia. Gomez Treadmill Score   was 6. There is a low risk of myocardial ischemia.        Assessment, Recommendations, & Plan:  60 y.o. female with HTN, HLD, & encounter for loop recorder at end of battery life    HTN-controlled, no changes    HLD-followed by PCP, on Lipitor, no changes  Total cholesterol-149  Triglycerides-104  HDL-58  LDL-70    Encounter for loop recorder at end of battery life-she would like to proceed with having this removed. She is aware that she will have to have COVID testing done. This is scheduled with Dr. Dat Joyner and his medical assistant came in and spoke with patient to go over when she would need to have COVID testing done and to be quarantined until the day of her procedure. She voiced understanding. Disposition - RTC as needed      Please do not hesitate to contact me for any questions or concerns.     Sincerely yours,    FABIENNE Sherman

## 2020-10-02 ENCOUNTER — TELEPHONE (OUTPATIENT)
Dept: CARDIOLOGY | Age: 55
End: 2020-10-02

## 2020-10-02 NOTE — TELEPHONE ENCOUNTER
Emerald requests that [employer] [place] return their call. The best time to reach her is Anytime. Thank you.

## 2020-10-05 NOTE — TELEPHONE ENCOUNTER
Called and spoke with patient, have LOOP REMOVAL scheduled for 10/26/2020 at 0730 with arrival of 0630. Patient is to be NPO after midnight. Patient instructed to arrive through front entrance of hospital and make immediate left. Patient advised they can have one person with them but they both must wear a mask. Patient advised may take morning medications with sip of water. Also advised patient must have COVID testing completed on 10/22/2020 anywhere from 800-1100 am at [facility]. Advised patient that they will be able to proceed with procedure as long as test results are negative. Patient made aware that if testing is not resulted evening prior to procedure that they may have to be rescheduled and possibly retested. Given instructions on where to go and to self quarantine between testing and procedure. Patient does not have IV dye allergy. Patient verbally understood. Called and spoke to KENN in cath lab on 10/05/2020 and scheduled procedure.

## 2020-10-22 ENCOUNTER — OFFICE VISIT (OUTPATIENT)
Age: 55
End: 2020-10-22

## 2020-10-22 VITALS — HEART RATE: 81 BPM | OXYGEN SATURATION: 97 % | TEMPERATURE: 97.8 F

## 2020-10-22 PROCEDURE — 99999 PR OFFICE/OUTPT VISIT,PROCEDURE ONLY: CPT | Performed by: NURSE PRACTITIONER

## 2020-10-24 LAB — SARS-COV-2, NAA: NOT DETECTED

## 2020-10-25 ASSESSMENT — ENCOUNTER SYMPTOMS
BACK PAIN: 0
VOMITING: 0
WHEEZING: 0
EYE DISCHARGE: 0
CONSTIPATION: 0
SHORTNESS OF BREATH: 0
ABDOMINAL DISTENTION: 0
DIARRHEA: 0
COUGH: 0
BLOOD IN STOOL: 0

## 2020-10-26 ENCOUNTER — HOSPITAL ENCOUNTER (OUTPATIENT)
Dept: CARDIAC CATH/INVASIVE PROCEDURES | Age: 55
Discharge: HOME | End: 2020-10-26
Attending: INTERNAL MEDICINE | Admitting: INTERNAL MEDICINE
Payer: OTHER GOVERNMENT

## 2020-10-26 VITALS
DIASTOLIC BLOOD PRESSURE: 75 MMHG | WEIGHT: 170 LBS | SYSTOLIC BLOOD PRESSURE: 134 MMHG | HEIGHT: 69 IN | TEMPERATURE: 98.1 F | OXYGEN SATURATION: 99 % | HEART RATE: 75 BPM | BODY MASS INDEX: 25.18 KG/M2 | RESPIRATION RATE: 18 BRPM

## 2020-10-26 LAB
BASOPHILS ABSOLUTE: 0.1 K/UL (ref 0–0.2)
BASOPHILS RELATIVE PERCENT: 0.7 % (ref 0–1)
EOSINOPHILS ABSOLUTE: 0.4 K/UL (ref 0–0.6)
EOSINOPHILS RELATIVE PERCENT: 4.9 % (ref 0–5)
HCT VFR BLD CALC: 39.9 % (ref 37–47)
HEMOGLOBIN: 13.3 G/DL (ref 12–16)
IMMATURE GRANULOCYTES #: 0 K/UL
LYMPHOCYTES ABSOLUTE: 2.3 K/UL (ref 1.1–4.5)
LYMPHOCYTES RELATIVE PERCENT: 30.1 % (ref 20–40)
MCH RBC QN AUTO: 29.2 PG (ref 27–31)
MCHC RBC AUTO-ENTMCNC: 33.3 G/DL (ref 33–37)
MCV RBC AUTO: 87.7 FL (ref 81–99)
MONOCYTES ABSOLUTE: 0.7 K/UL (ref 0–0.9)
MONOCYTES RELATIVE PERCENT: 9.3 % (ref 0–10)
NEUTROPHILS ABSOLUTE: 4.2 K/UL (ref 1.5–7.5)
NEUTROPHILS RELATIVE PERCENT: 54.9 % (ref 50–65)
PDW BLD-RTO: 12 % (ref 11.5–14.5)
PLATELET # BLD: 225 K/UL (ref 130–400)
PMV BLD AUTO: 9.9 FL (ref 9.4–12.3)
RBC # BLD: 4.55 M/UL (ref 4.2–5.4)
WBC # BLD: 7.6 K/UL (ref 4.8–10.8)

## 2020-10-26 PROCEDURE — 99152 MOD SED SAME PHYS/QHP 5/>YRS: CPT | Performed by: INTERNAL MEDICINE

## 2020-10-26 PROCEDURE — 36415 COLL VENOUS BLD VENIPUNCTURE: CPT

## 2020-10-26 PROCEDURE — 33286 RMVL SUBQ CAR RHYTHM MNTR: CPT

## 2020-10-26 PROCEDURE — 6370000000 HC RX 637 (ALT 250 FOR IP): Performed by: INTERNAL MEDICINE

## 2020-10-26 PROCEDURE — 85025 COMPLETE CBC W/AUTO DIFF WBC: CPT

## 2020-10-26 PROCEDURE — 2500000003 HC RX 250 WO HCPCS

## 2020-10-26 PROCEDURE — 2580000003 HC RX 258: Performed by: INTERNAL MEDICINE

## 2020-10-26 PROCEDURE — 99152 MOD SED SAME PHYS/QHP 5/>YRS: CPT

## 2020-10-26 PROCEDURE — 33286 RMVL SUBQ CAR RHYTHM MNTR: CPT | Performed by: INTERNAL MEDICINE

## 2020-10-26 PROCEDURE — 6360000002 HC RX W HCPCS

## 2020-10-26 RX ORDER — CHLORHEXIDINE GLUCONATE 4 G/100ML
SOLUTION TOPICAL ONCE
Status: COMPLETED | OUTPATIENT
Start: 2020-10-26 | End: 2020-10-26

## 2020-10-26 RX ORDER — SODIUM CHLORIDE 9 MG/ML
INJECTION, SOLUTION INTRAVENOUS CONTINUOUS
Status: DISCONTINUED | OUTPATIENT
Start: 2020-10-26 | End: 2020-10-26 | Stop reason: HOSPADM

## 2020-10-26 RX ADMIN — CHLORHEXIDINE GLUCONATE: 213 SOLUTION TOPICAL at 07:25

## 2020-10-26 RX ADMIN — SODIUM CHLORIDE: 9 INJECTION, SOLUTION INTRAVENOUS at 07:23

## 2020-10-26 NOTE — H&P
Patient:  Selina Garcia                  1965  MRN: [id number]    PROBLEM LIST:    Patient Active Problem List    Diagnosis Date Noted    Cryptogenic stroke [address] [address] 12/03/2016     Priority: Low     Overview Note:     Occurred on November 20, 2016. Did receive TPA. Sees Dr. Mayra Hernandez Status post placement of implantable loop recorder 11/23/2016     Priority: Low     Overview Note:     Dr Jose Mitchell hypertension 11/21/2016     Priority: Low    Aphasia 11/20/2016     Priority: Low    Hyperlipidemia 11/01/2013     Priority: Low       PRESENTATION: Selina Garcia is a 60y.o. year old female who presents with prior loop implantation in 2018 for cryptogenic CVA. Implant at end-of-life and not receiving data. No documented atrial fibrillation. Patient here for removal of loop recorder. REVIEW OF SYSTEMS:  Review of Systems   Constitutional: Negative for activity change, fatigue and fever. HENT: Negative for ear pain, hearing loss and tinnitus. Eyes: Negative for discharge and visual disturbance. Respiratory: Negative for cough, shortness of breath and wheezing. Cardiovascular: Negative for chest pain, palpitations and leg swelling. Gastrointestinal: Negative for abdominal distention, blood in stool, constipation, diarrhea and vomiting. Endocrine: Negative for cold intolerance, heat intolerance, polydipsia and polyuria. Genitourinary: Negative for dysuria and hematuria. Musculoskeletal: Negative for arthralgias, back pain and myalgias. Skin: Negative for pallor and rash. Neurological: Negative for seizures, syncope, weakness and headaches. Psychiatric/Behavioral: Negative for behavioral problems and dysphoric mood.        Past Medical History:      Diagnosis Date    Bronchitis     Cancer [facility])     uterine     [facility] ([facility] 11/23/2016    Dr. Lizzy Valdez    Fracture     right arm    Hyperlipidemia     Hypertension     Status post placement of implantable loop recorder 11/23/2016    Dr Morrison Fabry       Past Surgical History:      Procedure Laterality Date    BREAST ENHANCEMENT SURGERY  1988    BREAST SURGERY      mass removal-right    COLONOSCOPY      VICKY AND BSO      cervix removed    TUBAL LIGATION         Medications Prior to Admission:    Prior to Admission medications    Medication Sig Start Date End Date Taking? Authorizing Provider   omeprazole (PRILOSEC) 40 MG delayed release capsule Take 1 capsule by mouth daily 8/26/20   Anibal Varner MD   atorvastatin (LIPITOR) 40 MG tablet TAKE ONE TABLET BY MOUTH DAILY FOR HIGH CHOLESTEROL 6/10/20   Anibal Varner MD   hydroCHLOROthiazide (HYDRODIURIL) 25 MG tablet One tablet per day. 6/10/20   Anibal Varner MD   ZINC PO Take by mouth    Historical Provider, MD   Ascorbic Acid (VITAMIN C) 250 MG tablet Take 250 mg by mouth daily    Historical Provider, MD   vitamin D (CHOLECALCIFEROL) 1000 UNIT TABS tablet Take 1,000 Units by mouth daily    Historical Provider, MD   aspirin 81 MG EC tablet Take 1 tablet by mouth daily 11/23/16   Belen Harris MD   Omega-3 Fatty Acids (FISH OIL) 1200 MG CAPS Take by mouth daily    Historical Provider, MD   Clobetasol Prop Oint-Coal Tar (CLOBETAPLUS OINTMENT EX) Apply 1 Applicatorful topically as needed     Historical Provider, MD       Allergies:  Poison ivy extract    Past Social History:  Social History     Socioeconomic History    Marital status:      Spouse name: Not on file    Number of children: 2    Years of education: Not on file    Highest education level: Not on file   Occupational History    Occupation: [employer]   Social Needs    Financial resource strain: Not on file    Food insecurity     Worry: Not on file     Inability: Not on file   [employer] needs     Medical: Not on file     Non-medical: Not on file   Tobacco Use    Smoking status: Never Smoker    Smokeless tobacco: Never Used   Substance and Sexual Activity    Alcohol use: No    Drug use:  No  Sexual activity: Yes     Partners: Male   Lifestyle    Physical activity     Days per week: Not on file     Minutes per session: Not on file    Stress: Not on file   Relationships    Social connections     Talks on phone: Not on file     Gets together: Not on file     Attends Christianity service: Not on file     Active member of club or organization: Not on file     Attends meetings of clubs or organizations: Not on file     Relationship status: Not on file    Intimate partner violence     Fear of current or ex partner: Not on file     Emotionally abused: Not on file     Physically abused: Not on file     Forced sexual activity: Not on file   Other Topics Concern    Not on file   Social History Narrative    Not on file       Family History:       Problem Relation Age of Onset    Heart Disease Mother         cardiomyopathy, pacemaker    Hypertension Mother     Cancer Mother         non-hodgkins lymphoma in LL lobe, skin cancer    Other Mother         gb dz, phlebitis    Heart Disease Father     Lung Cancer Father     Heart Attack Brother         smoker, has stent    Heart Disease Sister         bypass, smoker    Thyroid Disease Sister         causes double vision    Heart Attack Sister     Elevated Lipids Sister     Thyroid Disease Sister     Elevated Lipids Sister     Elevated Lipids Sister     Heart Disease Sister         heart murmur    Other Brother         gb dz    Breast Cancer Paternal Cousin 48     Physical Exam:    Vitals: [facility] 08/24/2017   24HR INTAKE/OUTPUT:  No intake or output data in the 24 hours ending 10/25/20 1950    Physical Exam  Constitutional:       General: She is not in acute distress. Appearance: She is not diaphoretic. HENT:      Mouth/Throat:      Pharynx: No oropharyngeal exudate. Eyes:      General: No scleral icterus. Right eye: No discharge. Left eye: No discharge. Neck:      Thyroid: No thyromegaly. Vascular: No JVD. Cardiovascular:      Rate and Rhythm: Normal rate and regular rhythm. No extrasystoles are present. Heart sounds: Normal heart sounds, S1 normal and S2 normal. No murmur. No systolic murmur. No diastolic murmur. No friction rub. No gallop. No S3 or S4 sounds. Pulmonary:      Effort: Pulmonary effort is normal. No respiratory distress. Breath sounds: Normal breath sounds. No wheezing or rales. Chest:      Chest wall: No tenderness. Abdominal:      General: Bowel sounds are normal. There is no distension. Palpations: Abdomen is soft. There is no mass. Tenderness: There is no abdominal tenderness. There is no guarding or rebound. Hernia: No hernia is present. Musculoskeletal: Normal range of motion. Skin:     General: Skin is warm. Coloration: Skin is not pale. Findings: No rash. Neurological:      Mental Status: She is alert and oriented to person, place, and time. Cranial Nerves: No cranial nerve deficit. Deep Tendon Reflexes: Reflexes normal.         LAB DATA:  CBC: No results for input(s): WBC, HGB, PLT in the last 72 hours. BMP:  No results for input(s): NA, K, CL, CO2, BUN, CREATININE, GLUCOSE in the last 72 hours. Hepatic: No results for input(s): AST, ALT, ALB, BILITOT, ALKPHOS in the last 72 hours. CK, CKMB, Troponin: @LABRCNT (CKTOTAL:3, CKMB:3, TROPONINI:3)@  Pro-BNP: No results for input(s): BNP in the last 72 hours. Lipids: No results for input(s): CHOL, HDL in the last 72 hours. Invalid input(s): LDL  ABGs: No results for input(s): PHART, BTG8SVM, PO2ART, VMB2END, BEART, HGBAE, T4DZIFKZ, CARBOXHGBART, 02THERAPY in the last 72 hours. INR: No results for input(s): INR in the last 72 hours. A1c:Invalid input(s):  HEMOGLOBIN A1C  URINALYSIS: No results found for: NITRU, WBCUA, BACTERIA, RBCUA, BLOODU, SPECGRAV, GLUCOSEU  -----------------------------------------------------------------  IMAGING:  No orders to display         Assessment and Recommendations: This is a 60y.o. year old female with past medical history of hypertension, prior cryptogenic CVA with history of loop implantation 2018 with loop recorder at end-of-life not receiving data here for removal.    Risks, benefits, alternatives of implantable loop recorder removal discussed with the patient and full informed consent obtained.   Acceptable Mallampati score  Consent for moderate conscious sedation  ASA 3            Electronically signed by Yeison Willis MD on 10/25/2020 at 7:50 PM

## 2020-10-26 NOTE — PROCEDURES
Indications for Reveal LINQ removal -  end-of-life      Conscious Sedation Protocol Used During this Procedure -          Anesthesia: Moderate   Sedation:  1mg Midazolam (Versed)   0mcg Fentanyl   Start time: 7:49 AM    Stop time: 7:59 AM    ASA Class: 3    EBL  <2 ml      A trained medical personnel administered medications and my directions  After obtaining informed written consent, the patient was brought to the catheterization laboratory where the right groin was prepared in the usual sterile fashion. The patient was monitored continuously with ECG, pulse oximetry, blood pressure monitoring and direct observation. After obtaining informed consent, the patient was brought to the catheterization laboratory where the left chest was prepared in the usual sterile fashion. The 's hands were scrubbed in a betadine solution for 5 minutes. Utilizing local anesthesia and a percutaneous technique, a single puncture was made in the left chest.    The Implantable Cardiac Monitor was then removed and a sterile dressing applied. The patient was taken to her room in stable and satisfactory condition. No apparent complications. IMPRESSION:  Successful removal of a Implantable Cardiac Monitor (Reveal LINQ) for cryptogenic CVA.     Electronically signed by Bossman El MD on 10/26/20

## 2020-11-04 ENCOUNTER — NURSE ONLY (OUTPATIENT)
Dept: CARDIOLOGY | Age: 55
End: 2020-11-04

## 2020-11-04 PROCEDURE — 99024 POSTOP FOLLOW-UP VISIT: CPT | Performed by: NURSE PRACTITIONER

## 2020-11-04 NOTE — PROGRESS NOTES
Patient here for a wound check visit status post loop recorder removal implant. Incision dry and intact. No redness, swelling, or drainage noted  Edges well approximated  Instructed patient to wash area with antibacterial soap and keep it clean and dry. Discussed with FABIENNE Weaver. Patient to follow up as needed.

## 2021-06-04 PROBLEM — C55 MALIGNANT NEOPLASM OF UTERUS, PART UNSPECIFIED (HCC): Status: ACTIVE | Noted: 2021-06-04

## 2021-06-09 ENCOUNTER — PATIENT MESSAGE (OUTPATIENT)
Dept: FAMILY MEDICINE CLINIC | Age: 56
End: 2021-06-09

## 2021-06-09 DIAGNOSIS — E78.5 HYPERLIPIDEMIA, UNSPECIFIED HYPERLIPIDEMIA TYPE: ICD-10-CM

## 2021-06-09 DIAGNOSIS — I10 ESSENTIAL HYPERTENSION: Primary | ICD-10-CM

## 2021-06-10 RX ORDER — ATORVASTATIN CALCIUM 40 MG/1
TABLET, FILM COATED ORAL
Qty: 90 TABLET | Refills: 3 | Status: SHIPPED | OUTPATIENT
Start: 2021-06-10 | End: 2022-05-18

## 2021-06-10 RX ORDER — HYDROCHLOROTHIAZIDE 25 MG/1
TABLET ORAL
Qty: 90 TABLET | Refills: 3 | Status: SHIPPED | OUTPATIENT
Start: 2021-06-10 | End: 2022-05-18

## 2021-06-10 NOTE — TELEPHONE ENCOUNTER
From: Darrick Toussaint  To: Netta De MD  Sent: 6/9/2021 9:15 PM CDT  Subject: Non-Urgent Medical Question    Carmela Dick,  [facility] denied my prescription renewal of [address] and Atorvastatin can you work your magic or do I need to make an appointment! Lewis Cochran asked pretty please. Thank you.     Yasmine Tovar

## 2021-06-11 DIAGNOSIS — E78.5 HYPERLIPIDEMIA, UNSPECIFIED HYPERLIPIDEMIA TYPE: ICD-10-CM

## 2021-06-11 DIAGNOSIS — I10 ESSENTIAL HYPERTENSION: ICD-10-CM

## 2021-06-11 LAB
ALBUMIN SERPL-MCNC: 4.3 G/DL (ref 3.5–5.2)
ALP BLD-CCNC: 98 U/L (ref 35–104)
ALT SERPL-CCNC: 25 U/L (ref 5–33)
ANION GAP SERPL CALCULATED.3IONS-SCNC: 10 MMOL/L (ref 7–19)
AST SERPL-CCNC: 23 U/L (ref 5–32)
BACTERIA: ABNORMAL /HPF
BILIRUB SERPL-MCNC: 0.5 MG/DL (ref 0.2–1.2)
BILIRUBIN URINE: NEGATIVE
BLOOD, URINE: NEGATIVE
BUN BLDV-MCNC: 18 MG/DL (ref 6–20)
CALCIUM SERPL-MCNC: 9.3 MG/DL (ref 8.6–10)
CHLORIDE BLD-SCNC: 103 MMOL/L (ref 98–111)
CHOLESTEROL, TOTAL: 161 MG/DL (ref 160–199)
CLARITY: ABNORMAL
CO2: 31 MMOL/L (ref 22–29)
COLOR: YELLOW
CREAT SERPL-MCNC: 0.7 MG/DL (ref 0.5–0.9)
CRYSTALS, UA: ABNORMAL /HPF
EPITHELIAL CELLS, UA: 11 /HPF (ref 0–5)
GFR AFRICAN AMERICAN: >59
GFR NON-AFRICAN AMERICAN: >60
GLUCOSE BLD-MCNC: 99 MG/DL (ref 74–109)
GLUCOSE URINE: NEGATIVE MG/DL
HCT VFR BLD CALC: 36.8 % (ref 37–47)
HDLC SERPL-MCNC: 54 MG/DL (ref 65–121)
HEMOGLOBIN: 11.6 G/DL (ref 12–16)
HYALINE CASTS: 18 /HPF (ref 0–8)
KETONES, URINE: ABNORMAL MG/DL
LDL CHOLESTEROL CALCULATED: 83 MG/DL
LEUKOCYTE ESTERASE, URINE: ABNORMAL
MCH RBC QN AUTO: 26.9 PG (ref 27–31)
MCHC RBC AUTO-ENTMCNC: 31.5 G/DL (ref 33–37)
MCV RBC AUTO: 85.2 FL (ref 81–99)
NITRITE, URINE: NEGATIVE
PDW BLD-RTO: 13.4 % (ref 11.5–14.5)
PH UA: 5.5 (ref 5–8)
PLATELET # BLD: 313 K/UL (ref 130–400)
PMV BLD AUTO: 10.2 FL (ref 9.4–12.3)
POTASSIUM SERPL-SCNC: 4 MMOL/L (ref 3.5–5)
PROTEIN UA: ABNORMAL MG/DL
RBC # BLD: 4.32 M/UL (ref 4.2–5.4)
RBC UA: 2 /HPF (ref 0–4)
SODIUM BLD-SCNC: 144 MMOL/L (ref 136–145)
SPECIFIC GRAVITY UA: 1.03 (ref 1–1.03)
TOTAL PROTEIN: 7.2 G/DL (ref 6.6–8.7)
TRIGL SERPL-MCNC: 121 MG/DL (ref 0–149)
TSH SERPL DL<=0.05 MIU/L-ACNC: 2.13 UIU/ML (ref 0.27–4.2)
UROBILINOGEN, URINE: 0.2 E.U./DL
WBC # BLD: 7.5 K/UL (ref 4.8–10.8)
WBC UA: 44 /HPF (ref 0–5)

## 2021-06-14 ENCOUNTER — OFFICE VISIT (OUTPATIENT)
Dept: FAMILY MEDICINE CLINIC | Age: 56
End: 2021-06-14
Payer: OTHER GOVERNMENT

## 2021-06-14 VITALS
HEART RATE: 73 BPM | HEIGHT: 69 IN | DIASTOLIC BLOOD PRESSURE: 74 MMHG | OXYGEN SATURATION: 98 % | WEIGHT: 177.4 LBS | SYSTOLIC BLOOD PRESSURE: 124 MMHG | TEMPERATURE: 97.1 F | BODY MASS INDEX: 26.28 KG/M2

## 2021-06-14 DIAGNOSIS — E78.5 HYPERLIPIDEMIA, UNSPECIFIED HYPERLIPIDEMIA TYPE: ICD-10-CM

## 2021-06-14 DIAGNOSIS — I10 ESSENTIAL HYPERTENSION: Primary | ICD-10-CM

## 2021-06-14 DIAGNOSIS — D64.9 ANEMIA, UNSPECIFIED TYPE: ICD-10-CM

## 2021-06-14 LAB
BASOPHILS ABSOLUTE: 0.1 K/UL (ref 0–0.2)
BASOPHILS RELATIVE PERCENT: 0.9 % (ref 0–1)
EOSINOPHILS ABSOLUTE: 0.4 K/UL (ref 0–0.6)
EOSINOPHILS RELATIVE PERCENT: 4.6 % (ref 0–5)
FOLATE: 15.3 NG/ML (ref 4.8–37.3)
HCT VFR BLD CALC: 34.8 % (ref 37–47)
HEMOGLOBIN: 10.9 G/DL (ref 12–16)
IMMATURE GRANULOCYTES #: 0.1 K/UL
IRON SATURATION: 7 % (ref 14–50)
IRON: 32 UG/DL (ref 37–145)
LYMPHOCYTES ABSOLUTE: 2.4 K/UL (ref 1.1–4.5)
LYMPHOCYTES RELATIVE PERCENT: 30.1 % (ref 20–40)
MCH RBC QN AUTO: 26.3 PG (ref 27–31)
MCHC RBC AUTO-ENTMCNC: 31.3 G/DL (ref 33–37)
MCV RBC AUTO: 84.1 FL (ref 81–99)
MONOCYTES ABSOLUTE: 0.7 K/UL (ref 0–0.9)
MONOCYTES RELATIVE PERCENT: 9 % (ref 0–10)
NEUTROPHILS ABSOLUTE: 4.3 K/UL (ref 1.5–7.5)
NEUTROPHILS RELATIVE PERCENT: 54.5 % (ref 50–65)
PDW BLD-RTO: 13.5 % (ref 11.5–14.5)
PLATELET # BLD: 294 K/UL (ref 130–400)
PMV BLD AUTO: 10.4 FL (ref 9.4–12.3)
RBC # BLD: 4.14 M/UL (ref 4.2–5.4)
TOTAL IRON BINDING CAPACITY: 434 UG/DL (ref 250–400)
VITAMIN B-12: 389 PG/ML (ref 211–946)
WBC # BLD: 7.8 K/UL (ref 4.8–10.8)

## 2021-06-14 PROCEDURE — 99214 OFFICE O/P EST MOD 30 MIN: CPT | Performed by: FAMILY MEDICINE

## 2021-06-14 ASSESSMENT — PATIENT HEALTH QUESTIONNAIRE - PHQ9
1. LITTLE INTEREST OR PLEASURE IN DOING THINGS: 0
SUM OF ALL RESPONSES TO PHQ QUESTIONS 1-9: 0
2. FEELING DOWN, DEPRESSED OR HOPELESS: 0
SUM OF ALL RESPONSES TO PHQ9 QUESTIONS 1 & 2: 0

## 2021-06-14 ASSESSMENT — ENCOUNTER SYMPTOMS
GASTROINTESTINAL NEGATIVE: 1
EYES NEGATIVE: 1
RESPIRATORY NEGATIVE: 1
ALLERGIC/IMMUNOLOGIC NEGATIVE: 1

## 2021-06-14 NOTE — PROGRESS NOTES
SUBJECTIVE:    Patient ID: Aurora Rios is a 60 y. o.female. HPI:   Patient here for follow-up multiple medical problems  Patient is a 38-year-old white female she had past medical history significant for hypertension. She takes hypertensive medicine. She is compliant with medication. Blood work reviewed today. She also takes high potency statin therapy. Denies medication side effect. LDL less than 100. She was also found to be anemic. Denies any bloody stools or black stools. Colonoscopy is up-to-date. No menstrual periods. She did donate blood recently. Past Medical History:   Diagnosis Date    Bronchitis     CAD (coronary artery disease)     Cancer ([facility] [address]     uterine     Cryptogenic stroke ([facility] [address] [address] 11/23/2016    Dr. Mora Ly    Fracture     right arm    Hyperlipidemia     Hypertension     Status post placement of implantable loop recorder 11/23/2016    Dr Miguel Ulloa      Current Outpatient Medications   Medication Sig Dispense Refill    atorvastatin (LIPITOR) 40 MG tablet TAKE ONE TABLET BY MOUTH DAILY FOR HIGH CHOLESTEROL 90 tablet 3    hydroCHLOROthiazide (HYDRODIURIL) 25 MG tablet One tablet per day. 90 tablet 3    ZINC PO Take by mouth      Ascorbic Acid (VITAMIN C) 250 MG tablet Take 250 mg by mouth daily      vitamin D (CHOLECALCIFEROL) 1000 UNIT TABS tablet Take 1,000 Units by mouth daily      aspirin 81 MG EC tablet Take 1 tablet by mouth daily 30 tablet 3    Omega-3 Fatty Acids (FISH OIL) 1200 MG CAPS Take by mouth daily      Clobetasol Prop Oint-Coal Tar (CLOBETAPLUS OINTMENT EX) Apply 1 Applicatorful topically as needed        No current facility-administered medications for this visit. Allergies   Allergen Reactions    Poison Ivy Extract Rash       Review of Systems   Constitutional: Negative. HENT: Negative. Eyes: Negative. Respiratory: Negative. Cardiovascular: Negative. Gastrointestinal: Negative. Endocrine: Negative. Genitourinary: Negative. supple. Right lower leg: No edema. Left lower leg: No edema. Lymphadenopathy:      Cervical: No cervical adenopathy. Right cervical: No superficial cervical adenopathy. Left cervical: No superficial cervical adenopathy. Upper Body:      Right upper body: No supraclavicular adenopathy. Left upper body: No supraclavicular adenopathy. Skin:     General: Skin is warm and dry. Coloration: Skin is not pale. Findings: No lesion or rash. Nails: There is no clubbing. Neurological:      Mental Status: She is alert and oriented to person, place, and time. Motor: No weakness or tremor. Coordination: Coordination normal.      Deep Tendon Reflexes: Reflexes are normal and symmetric. Psychiatric:         Attention and Perception: Attention normal.         Mood and Affect: Mood normal.         Speech: Speech normal.         Behavior: Behavior normal. Behavior is cooperative. Thought Content: Thought content normal.         Cognition and Memory: Cognition and memory normal.         Judgment: Judgment normal.        /74 (Site: Left Upper Arm, Position: Sitting, Cuff Size: Medium Adult)   Pulse 73   Temp 97.1 °F (36.2 °C) (Temporal)   Ht 5' 8.5\" (1.74 m)   Wt 177 lb 6.4 oz (80.5 kg)   LMP 08/24/2017   SpO2 98%   BMI 26.58 kg/m²      ASSESSMENT:     Diagnosis Orders   1. Essential hypertension-well-controlled    2. Hyperlipidemia, unspecified hyperlipidemia type-on therapy    3. Anemia, unspecified type-need to investigate Iron and TIBC    Vitamin B12    Folate    Blood Occult Stool Diagnostic    CBC Auto Differential        PLAN:    1. Continue medication  2. Continue medication  3. Work-up. Follow-up 6 months or earlier.

## 2021-06-15 ENCOUNTER — PATIENT MESSAGE (OUTPATIENT)
Dept: FAMILY MEDICINE CLINIC | Age: 56
End: 2021-06-15

## 2021-06-15 DIAGNOSIS — D64.9 ANEMIA, UNSPECIFIED TYPE: Primary | ICD-10-CM

## 2021-06-17 NOTE — TELEPHONE ENCOUNTER
From: Zeb Pyle  Sent: 6/17/2021 8:37 AM CDT  To: [address] [place] [address] Clinical Staff  Subject: RE: Lab results    Thank you Ayse Norris will pass along your message to [place]. And yes if you'll work on a GI referral, no silverman. Cailin Alfredo    ----- Message -----  From: Rashmi Jaramillo  Sent: 6/15/21, 3:53 PM  To: Merrill Lashanda  Subject: Lab results    Dr. Edin Nolasco has reviewed your lab results. Your iron level is low and he recommends iron 325 mg once daily. Recommend GI consult for iron deficiency anemia. I know you and [place] have a lot going on right now, and please pass along my condolences to [place]. Let me know if you want to proceed with the GI referral and I will get that in the works. If you have any questions or concerns, please let me know.     Rosy Paige

## 2021-06-30 ENCOUNTER — OFFICE VISIT (OUTPATIENT)
Dept: GASTROENTEROLOGY | Age: 56
End: 2021-06-30
Payer: OTHER GOVERNMENT

## 2021-06-30 VITALS
OXYGEN SATURATION: 98 % | HEART RATE: 65 BPM | DIASTOLIC BLOOD PRESSURE: 70 MMHG | SYSTOLIC BLOOD PRESSURE: 136 MMHG | WEIGHT: 175.2 LBS | BODY MASS INDEX: 25.95 KG/M2 | HEIGHT: 69 IN

## 2021-06-30 DIAGNOSIS — R11.0 NAUSEA: ICD-10-CM

## 2021-06-30 DIAGNOSIS — K59.09 CHRONIC CONSTIPATION: ICD-10-CM

## 2021-06-30 DIAGNOSIS — D50.9 IRON DEFICIENCY ANEMIA, UNSPECIFIED IRON DEFICIENCY ANEMIA TYPE: Primary | ICD-10-CM

## 2021-06-30 PROCEDURE — 99203 OFFICE O/P NEW LOW 30 MIN: CPT | Performed by: NURSE PRACTITIONER

## 2021-06-30 ASSESSMENT — ENCOUNTER SYMPTOMS
RECTAL PAIN: 0
BACK PAIN: 0
ABDOMINAL DISTENTION: 0
ABDOMINAL PAIN: 0
VOMITING: 0
BLOOD IN STOOL: 0
DIARRHEA: 0
TROUBLE SWALLOWING: 0
VOICE CHANGE: 0
SHORTNESS OF BREATH: 0
COUGH: 0
SORE THROAT: 0
NAUSEA: 1
ANAL BLEEDING: 0
CONSTIPATION: 1

## 2021-06-30 NOTE — PROGRESS NOTES
Subjective:      Nash Agustin is a59 y.o. female  Chief Complaint   Patient presents with    New Patient    Anemia       HPI  PCP: Pedro Clements MD  Referring Provider: Pedro Clements MD  New pt referral.  From PCP. For anemia. Labs 6/2021: H/H 11.6 / 36.8, iron low at 32, iron sat low at 7%, B12 normal, folate normal.  I have reviewed previous labs in [facility]. The anemia is new. Pt denies any evidence of GI bleeding (melena, brbpr, hematemesis). Does report darker stools since starting oral iron a few weeks ago, however stools are not tarry and sticky. No vaginal bleeding. No hx of SBO in the event an M2A is warranted. Pt reports chronic constipation. Has a BM every other day, takes miralax prn and this works well for her. Reports nausea. Noted after eating sometimes. Started a few years ago. Occurs once a week on average. No vomiting. GB intact. Has never had EGD. Last colonoscopy was in 2013 and was normal.    Family HX:    Pt denies family hx of colon polyps, colon CA, inflammatory bowel dx, gastric CA and esophageal CA.     Past Medical History:   Diagnosis Date    Bronchitis     CAD (coronary artery disease)     Cancer ([facility] [address] [facility]     uterine     Cryptogenic stroke ([facility] [address] 11/23/2016    Dr. Souza Coupe    Fracture     right arm    Hyperlipidemia     Hypertension     Status post placement of implantable loop recorder 11/23/2016    Dr John Peters          Past Surgical History:   Procedure Laterality Date    BREAST ENHANCEMENT SURGERY  1988    BREAST SURGERY      mass removal-right    COLONOSCOPY  11/26/2013    Dr Horne Maribeth: normal 10y recall    VICKY AND BSO      cervix removed    TUBAL LIGATION         Social History     Socioeconomic History    Marital status:      Spouse name: None    Number of children: 2    Years of education: None    Highest education level: None   Occupational History    Occupation: [employer]   Tobacco Use    Smoking status: Never Smoker    Smokeless tobacco: Never Used   Vaping Use    Vaping Use: Never used   Substance and Sexual Activity    Alcohol use: Yes     Comment: rare     Drug use: No    Sexual activity: Yes     Partners: Male   Other Topics Concern    None   Social History Narrative    None     Social Determinants of Health     Financial Resource Strain:     Difficulty of Paying Living Expenses:    Food Insecurity:     Worried About Running Out of Food in the Last Year:     Ran Out of Food in the Last Year:    Transportation Needs:     Lack of Transportation (Medical):  Lack of Transportation (Non-Medical):    Physical Activity:     Days of Exercise per Week:     Minutes of Exercise per Session:    Stress:     Feeling of Stress :    Social Connections:     Frequency of Communication with Friends and Family:     Frequency of Social Gatherings with Friends and Family:     Attends Yazdanism Services:     Active Member of Clubs or Organizations:     Attends Club or Organization Meetings:     Marital Status:    Intimate Partner Violence:     Fear of Current or Ex-Partner:     Emotionally Abused:     Physically Abused:     Sexually Abused: Allergies   Allergen Reactions    Poison Ivy Extract Rash       Current Outpatient Medications   Medication Sig Dispense Refill    Ferrous Sulfate (IRON PO) Take by mouth daily      atorvastatin (LIPITOR) 40 MG tablet TAKE ONE TABLET BY MOUTH DAILY FOR HIGH CHOLESTEROL 90 tablet 3    hydroCHLOROthiazide (HYDRODIURIL) 25 MG tablet One tablet per day.  90 tablet 3    ZINC PO Take by mouth daily       Ascorbic Acid (VITAMIN C) 250 MG tablet Take 250 mg by mouth daily      vitamin D (CHOLECALCIFEROL) 1000 UNIT TABS tablet Take 1,000 Units by mouth daily      aspirin 81 MG EC tablet Take 1 tablet by mouth daily 30 tablet 3    Omega-3 Fatty Acids (FISH OIL) 1200 MG CAPS Take by mouth daily      Clobetasol Prop Oint-Coal Tar (CLOBETAPLUS OINTMENT EX) Apply 1 Applicatorful topically as needed motion and neck supple. Neurological:      Mental Status: She is alert and oriented to person, place, and time. Cranial Nerves: No cranial nerve deficit. Psychiatric:         Judgment: Judgment normal.           Assessment:       Diagnosis Orders   1. Iron deficiency anemia, unspecified iron deficiency anemia type  COLONOSCOPY W/ OR W/O BIOPSY    ESOPHAGOSCOPY / EGD   2. Nausea  ESOPHAGOSCOPY / EGD   3. Chronic constipation  COLONOSCOPY W/ OR W/O BIOPSY         Plan:      1. Schedule outpatient endoscopy-r/o celiac and h pylori. Patient advised no Aspirin, Fish Oil, Vit E or NSAIDs 5 (five) days before procedure. Follow-up Visit: per   Pt Education:    Risks, benefits, and alternatives to endoscopy were discussed. Patient voices understanding of risks of, but not limited to, perforation, bleeding, and infection. The risk of perforation is increased with esophageal dilatation. All questions answered to patient's satisfaction. Patient is agreable to proceed. 2. Schedule outpatient colonoscopy. Patient advised no Aspirin, Fish Oil, Vit E or NSAIDs 5 (five) days before procedure. Follow-up Visit: per Dr Porfirio Ortega  Pt education:  Risks, benefits, and alternatives to colonoscopy were discussed. Risks of colonoscopy include, but are not limited to, perforation, bleeding, and infection. We discussed that the risk for perforation is 1-3 in 5,000  at the time of colonoscopy;   and 1-2% risk of bleeding post-polypectomy. All questions answered to the satisfaction of the patient. Pt is agreeable to proceed.

## 2021-07-16 ENCOUNTER — HOSPITAL ENCOUNTER (OUTPATIENT)
Age: 56
Setting detail: SPECIMEN
Discharge: HOME | End: 2021-07-16
Payer: OTHER GOVERNMENT

## 2021-07-16 ENCOUNTER — ANESTHESIA (OUTPATIENT)
Dept: OPERATING ROOM | Age: 56
End: 2021-07-16

## 2021-07-16 ENCOUNTER — APPOINTMENT (OUTPATIENT)
Dept: OPERATING ROOM | Age: 56
End: 2021-07-16

## 2021-07-16 ENCOUNTER — HOSPITAL ENCOUNTER (OUTPATIENT)
Age: 56
Setting detail: OUTPATIENT SURGERY
Discharge: HOME | End: 2021-07-16
Attending: INTERNAL MEDICINE | Admitting: INTERNAL MEDICINE
Payer: OTHER GOVERNMENT

## 2021-07-16 ENCOUNTER — ANESTHESIA EVENT (OUTPATIENT)
Dept: OPERATING ROOM | Age: 56
End: 2021-07-16

## 2021-07-16 VITALS
DIASTOLIC BLOOD PRESSURE: 78 MMHG | TEMPERATURE: 97.1 F | BODY MASS INDEX: 25.48 KG/M2 | SYSTOLIC BLOOD PRESSURE: 142 MMHG | WEIGHT: 172 LBS | HEART RATE: 71 BPM | HEIGHT: 69 IN | OXYGEN SATURATION: 100 % | RESPIRATION RATE: 18 BRPM

## 2021-07-16 VITALS
SYSTOLIC BLOOD PRESSURE: 179 MMHG | RESPIRATION RATE: 17 BRPM | DIASTOLIC BLOOD PRESSURE: 106 MMHG | OXYGEN SATURATION: 99 %

## 2021-07-16 PROCEDURE — 88305 TISSUE EXAM BY PATHOLOGIST: CPT

## 2021-07-16 PROCEDURE — 45378 DIAGNOSTIC COLONOSCOPY: CPT

## 2021-07-16 PROCEDURE — 43239 EGD BIOPSY SINGLE/MULTIPLE: CPT

## 2021-07-16 PROCEDURE — 43239 EGD BIOPSY SINGLE/MULTIPLE: CPT | Performed by: INTERNAL MEDICINE

## 2021-07-16 PROCEDURE — G8918 PT W/O PREOP ORDER IV AB PRO: HCPCS

## 2021-07-16 PROCEDURE — G8907 PT DOC NO EVENTS ON DISCHARG: HCPCS

## 2021-07-16 PROCEDURE — 88342 IMHCHEM/IMCYTCHM 1ST ANTB: CPT

## 2021-07-16 PROCEDURE — 45378 DIAGNOSTIC COLONOSCOPY: CPT | Performed by: INTERNAL MEDICINE

## 2021-07-16 RX ORDER — PROPOFOL 10 MG/ML
INJECTION, EMULSION INTRAVENOUS PRN
Status: DISCONTINUED | OUTPATIENT
Start: 2021-07-16 | End: 2021-07-16 | Stop reason: SDUPTHER

## 2021-07-16 RX ORDER — SODIUM CHLORIDE 9 MG/ML
INJECTION, SOLUTION INTRAVENOUS CONTINUOUS
Status: DISCONTINUED | OUTPATIENT
Start: 2021-07-16 | End: 2021-07-16 | Stop reason: HOSPADM

## 2021-07-16 RX ADMIN — SODIUM CHLORIDE: 9 INJECTION, SOLUTION INTRAVENOUS at 12:18

## 2021-07-16 RX ADMIN — PROPOFOL 170 MG: 10 INJECTION, EMULSION INTRAVENOUS at 13:28

## 2021-07-16 RX ADMIN — PROPOFOL 100 MG: 10 INJECTION, EMULSION INTRAVENOUS at 13:12

## 2021-07-16 RX ADMIN — PROPOFOL 100 MG: 10 INJECTION, EMULSION INTRAVENOUS at 13:07

## 2021-07-16 NOTE — OP NOTE
Endoscopic Procedure Note    Patient: David Hale : 1965  Med Rec#: [id number] Acc#: [id number]     Primary Care Provider Jasen Coppola MD    Endoscopist: Negro Arguello MD, MD    Date of Procedure:  2021    Procedure:   1. EGD with cold biopsies    Indications: Both EGD and colonoscopy exams today:  1. Iron deficiency anemia, unspecified iron deficiency anemia type          2. Nausea     3. Chronic constipation       Anesthesia:  Sedation was administered by anesthesia who monitored the patient during the procedure. Estimated Blood Loss: minimal    Procedure:   After reviewing the patient's chart and obtaining informed consent, the patient was placed in the left lateral decubitus position. A forward-viewing Olympus endoscope was lubricated and inserted through the mouth into the oropharynx. Under direct visualization, the upper esophagus was intubated. The scope was advanced to the level of the third portion of duodenum. Scope was slowly withdrawn with careful inspection of the mucosal surfaces. The scope was retroflexed for inspection of the gastric fundus and incisura. Findings and maneuvers are listed in impression below. The patient tolerated the procedure well. The scope was removed. There were no immediate complications. Findings/IMPRESSION:  Esophagus: normal and the normal EG junction at 40 cm. There is no obvious hiatal hernia present. Stomach:  abnormal:  mucosal changes in the antrum with patchy linear erythema and few small 1 to 2 mm erosions suggestive of mild gastritis noted -  Gastric biopsies were taken from the antrum and body to rule out Helicobacter pylori infection. NO ulcers or masses or gastric outlet obstruction or retained food or fluid. Rugae were normal and lumen distended well with insufflation. Retroflexed views otherwise revealed a normal GE junction, fundus and cardia as well.       Duodenum: abnormal: Mild patchy erythema noted in the posterior duodenal bulb; otherwise, Normal. Random biopsies were taken to check for Celiac disease and other causes of villous atrophy. RECOMMENDATIONS:    1. Await path results, the patient will be contacted in 7-10 days with biopsy results. 2.  Avoid NSAIDs  3. May use a PPI or H2 receptor blocker once daily for mild patchy gastritis and duodenitis    The results were discussed with the patient and family. A copy of the images obtained were given to the patient.      David Yoo MD, MD  7/16/2021  11:56 AM

## 2021-07-16 NOTE — ANESTHESIA PRE PROCEDURE
uterus, part unspecified ([facility] C55    Iron deficiency anemia D50.9    Nausea R11.0    Chronic constipation K59.09       Past Medical History:        Diagnosis Date    Bronchitis     CAD (coronary artery disease)     Cancer ([facility]     uterine     Cryptogenic stroke ([facility] 11/23/2016    Dr. David Sung    Fracture     right arm    Hyperlipidemia     Hypertension     Status post placement of implantable loop recorder 11/23/2016    Dr Hermes Bright       Past Surgical History:        Procedure Laterality Date    BREAST ENHANCEMENT SURGERY  1988    BREAST SURGERY      mass removal-right    COLONOSCOPY  11/26/2013    Dr Wale Garcia: normal 10y recall    VICKY AND BSO      cervix removed    TUBAL LIGATION         Social History:    Social History     Tobacco Use    Smoking status: Never Smoker    Smokeless tobacco: Never Used   Substance Use Topics    Alcohol use: Yes     Comment: rare                                 Counseling given: Not Answered      Vital Signs (Current):   Vitals:    07/16/21 1209   BP: 139/84   Pulse: 77   Resp: 16   Temp: 97.1 °F (36.2 °C)   SpO2: 99%   Weight: 172 lb (78 kg)   Height: 5' 9\" (1.753 m)                                              BP Readings from Last 3 Encounters:   07/16/21 139/84   06/30/21 136/70   06/14/21 124/74       NPO Status: Time of last liquid consumption: 0830                        Time of last solid consumption: 2300                        Date of last liquid consumption: 07/16/21                        Date of last solid food consumption: 07/14/21    BMI:   Wt Readings from Last 3 Encounters:   07/16/21 172 lb (78 kg)   06/30/21 175 lb 3.2 oz (79.5 kg)   06/14/21 177 lb 6.4 oz (80.5 kg)     Body mass index is 25.4 kg/m².     CBC:   Lab Results   Component Value Date    WBC 7.8 06/14/2021    RBC 4.14 06/14/2021    HGB 10.9 06/14/2021    HCT 34.8 06/14/2021    MCV 84.1 06/14/2021    RDW 13.5 06/14/2021     06/14/2021       CMP:   Lab Results   Component Value Date  06/11/2021    K 4.0 06/11/2021     06/11/2021    CO2 31 06/11/2021    BUN 18 06/11/2021    CREATININE 0.7 06/11/2021    GFRAA >59 06/11/2021    LABGLOM >60 06/11/2021    GLUCOSE 99 06/11/2021    PROT 7.2 06/11/2021    CALCIUM 9.3 06/11/2021    BILITOT 0.5 06/11/2021    ALKPHOS 98 06/11/2021    AST 23 06/11/2021    ALT 25 06/11/2021       POC Tests: No results for input(s): POCGLU, POCNA, POCK, POCCL, POCBUN, POCHEMO, POCHCT in the last 72 hours. Coags:   Lab Results   Component Value Date    PROTIME 13.7 11/21/2016    INR 1.05 11/21/2016       HCG (If Applicable):   Lab Results   Component Value Date    PREGTESTUR NEGATIVE (L) 11/26/2013        ABGs: No results found for: PHART, PO2ART, PNF7MGW, NGK1RGB, BEART, L2WSFWXW     Type & Screen (If Applicable):  No results found for: LABABO, LABRH    Drug/Infectious Status (If Applicable):  No results found for: HIV, HEPCAB    COVID-19 Screening (If Applicable):   Lab Results   Component Value Date    COVID19 NOT DETECTED 10/22/2020           Anesthesia Evaluation  Patient summary reviewed and Nursing notes reviewed  Airway: Mallampati: I  TM distance: >3 FB   Neck ROM: full  Mouth opening: > = 3 FB Dental: normal exam   (+) caps      Pulmonary:Negative Pulmonary ROS and normal exam  breath sounds clear to auscultation                             Cardiovascular:Negative CV ROS            Rhythm: regular  Rate: normal                    Neuro/Psych:   Negative Neuro/Psych ROS              GI/Hepatic/Renal: Neg GI/Hepatic/Renal ROS            Endo/Other: Negative Endo/Other ROS                    Abdominal:             Vascular: negative vascular ROS. Other Findings:             Anesthesia Plan      MAC     ASA 2       Induction: intravenous. Anesthetic plan and risks discussed with patient.                       FABIENNE Flanagan - JOHANA   7/16/2021

## 2021-07-16 NOTE — H&P
daily    Historical Provider, MD   vitamin D (CHOLECALCIFEROL) 1000 UNIT TABS tablet Take 1,000 Units by mouth daily    Historical Provider, MD   aspirin 81 MG EC tablet Take 1 tablet by mouth daily 11/23/16   Wilfred Green MD   Omega-3 Fatty Acids (FISH OIL) 1200 MG CAPS Take by mouth daily    Historical Provider, MD   Clobetasol Prop Oint-Coal Tar (CLOBETAPLUS OINTMENT EX) Apply 1 Applicatorful topically as needed     Historical Provider, MD       Past Medical History:  Past Medical History:   Diagnosis Date    Bronchitis     CAD (coronary artery disease)     Cancer ([facility] [address] [facility]     uterine     Cryptogenic stroke ([facility] [address] [facility] 11/23/2016    Dr. Frederick Alisa    Fracture     right arm    Hyperlipidemia     Hypertension     Status post placement of implantable loop recorder 11/23/2016    Dr Bora Patel       Past Surgical History:  Past Surgical History:   Procedure Laterality Date    BREAST ENHANCEMENT SURGERY  1988    BREAST SURGERY      mass removal-right    COLONOSCOPY  11/26/2013    Dr Brown English: normal 10y recall    VICKY AND BSO      cervix removed    TUBAL LIGATION         Social History:  Social History     Tobacco Use    Smoking status: Never Smoker    Smokeless tobacco: Never Used   Vaping Use    Vaping Use: Never used   Substance Use Topics    Alcohol use: Yes     Comment: rare     Drug use: No       Vital Signs: There were no vitals filed for this visit. Physical Exam:  Cardiac:  [x]WNL  []Comments:  Pulmonary:  [x]WNL   []Comments:  Neuro/Mental Status:  [x]WNL  []Comments:  Abdominal:  [x]WNL    []Comments:  Other:   []WNL  []Comments:    Informed Consent:  The risks and benefits of the procedure have been discussed with either the patient or if they cannot consent, their representative. Assessment:  Patient examined and appropriate for planned sedation and procedure. Plan:  Proceed with planned sedation and procedure as above.          Sirena Sotelo MD

## 2021-07-16 NOTE — ANESTHESIA POSTPROCEDURE EVALUATION
Department of Anesthesiology  Postprocedure Note    Patient: Pushpa Correa  MRN: [id number]  YOB: 1965  Date of evaluation: 7/16/2021  Time:  1:33 PM     Procedure Summary     Date: 07/16/21 Room / Location: [facility] ENDO 02 / [address]    Anesthesia Start: 9768 Anesthesia Stop: 4998    Procedures:       EGD BIOPSY (N/A Esophagus)      COLONOSCOPY DIAGNOSTIC (N/A Abdomen) Diagnosis: (ANEMIA, NAUSEA)    Surgeons: Teodora Hall MD Responsible Provider: FABIENNE Flanagan CRNA    Anesthesia Type: MAC ASA Status: 2          Anesthesia Type: MAC    Froylan Phase I:      Froylan Phase II:      Last vitals: Reviewed and per EMR flowsheets.        Anesthesia Post Evaluation    Patient location during evaluation: bedside  Patient participation: complete - patient participated  Level of consciousness: awake  Pain score: 0  Airway patency: patent  Nausea & Vomiting: no nausea and no vomiting  Complications: no  Cardiovascular status: hemodynamically stable  Respiratory status: acceptable  Hydration status: euvolemic

## 2021-07-16 NOTE — OP NOTE
Patient: Marissa Prieto : 1965  [facility] Rec#: [id number] Acc#: [id number]   Primary Care Provider Tanna Liao MD    Date of Procedure:  2021    Endoscopist: Addie Galindo MD, MD    Referring Provider: Tanna Liao MD,     Operation Performed: Colonoscopy to terminal ileum    Indications: Both EGD and colonoscopy exams today:  1. Iron deficiency anemia, unspecified iron deficiency anemia type          2. Nausea     3. Chronic constipation       Anesthesia:  Sedation was administered by anesthesia who monitored the patient during the procedure. I met with Marissa Prieto prior to procedure. We discussed the procedure itself, and I have discussed the risks of endoscopy (including-- but not limited to-- pain, discomfort, bleeding potentially requiring second endoscopic procedure and/or blood transfusion, organ perforation requiring operative repair, damage to organs near the colon, infection, aspiration, cardiopulmonary/allergic reaction), benefits, indications to endoscopy. Additionally, we discussed options other than colonoscopy. The patient expressed understanding. All questions answered. The patient decided to proceed with the procedure. Signed informed consent was placed on the chart. Blood Loss: minimal    Withdrawal time: More than 6-minute  Bowel Prep: Fair with small amounts thick semisolid stool scattered in patchy segments throughout the colon obscuring the underlying mucosa. Small lesions including polyps may have been missed. Complications: no immediate complications    DESCRIPTION OF PROCEDURE:     A time out was performed. After written informed consent was obtained, the patient was placed in the left lateral position. The perianal area was inspected, and a digital rectal exam was performed. A rectal exam was performed: normal tone, no palpable lesions.  At this point, a forward viewing Olympus colonoscope was inserted into the anus and carefully advanced to the terminal ileum.  The cecum was identified by the ileocecal valve and the appendiceal orifice. The colonoscope was then slowly withdrawn with careful inspection of the mucosa in a linear and circumferential fashion. The scope was retroflexed in the rectum. Suction was utilized during the procedure to remove as much air as possible from the bowel. The colonoscope was removed from the patient, and the procedure was terminated. Findings are listed below. Findings:     NO large polyps or masses or strictures or colitis. Suboptimal exam due to prep quality as described above. Mild diverticulosis in the left colon  Internal hemorrhoids-Grade 1  Where it was clearly visible, the mucosa appeared normal throughout the entire examined colon  Retroflexion in the rectum was otherwise normal and revealed no further abnormalities     Recommendations:  1. Repeat colonoscopy: In 5 years for colorectal cancer screening due to suboptimal prep today; sooner if her personal or family history as pertaining to her colorectal cancer risk were to change requiring an earlier exam  2. - Resume previous meds and diet  - GI clinic f/u 6 weeks; monitor hemoglobin and hematocrit periodically. If further decline in the hemoglobin were to be noted, consider a small bowel PillCam study for further evaluation and completion of her GI work-up. - Keep scheduled f/u appts with other MDs     - NO ASA/NSAIDs x 2 weeks    Findings and recommendations were discussed w/ the patient. A copy of the images was provided.     Jose Damon MD, MD  7/16/2021  11:56 AM

## 2021-07-19 ENCOUNTER — TELEPHONE (OUTPATIENT)
Dept: GASTROENTEROLOGY | Age: 56
End: 2021-07-19

## 2021-07-19 NOTE — TELEPHONE ENCOUNTER
Findings/IMPRESSION:  Esophagus: normal and the normal EG junction at 40 cm. There is no obvious hiatal hernia present.       Stomach:  abnormal:  mucosal changes in the antrum with patchy linear erythema and few small 1 to 2 mm erosions suggestive of mild gastritis noted -  Gastric biopsies were taken from the antrum and body to rule out Helicobacter pylori infection.     NO ulcers or masses or gastric outlet obstruction or retained food or fluid. Rugae were normal and lumen distended well with insufflation. Retroflexed views otherwise revealed a normal GE junction, fundus and cardia as well. Duodenum: abnormal: Mild patchy erythema noted in the posterior duodenal bulb; otherwise, Normal. Random biopsies were taken to check for Celiac disease and other causes of villous atrophy.      RECOMMENDATIONS:    1. Await path results, the patient will be contacted in 7-10 days with biopsy results. 2.  Avoid NSAIDs  3. May use a PPI or H2 receptor blocker once daily for mild patchy gastritis and duodenitis     The results were discussed with the patient and family. A copy of the images obtained were given to the patient.      Negro Arguello MD, MD  7/16/2021  11:56 AM      Patient called today from [phone] said she had both Egd/Cln  on 7-16-21 ans was told to avoid NSAIDS like ASA or IBP. Patient said she has developed a head cold and was asking what she can use. I tried to call the patient back @ 3:12 pm, she did not answer, I left her a VM that Tylenol PRN was about the only thing recommended. I told the patient in VM to call if she had any further questions.  Juan novak

## 2021-09-03 ENCOUNTER — OFFICE VISIT (OUTPATIENT)
Dept: GASTROENTEROLOGY | Age: 56
End: 2021-09-03
Payer: OTHER GOVERNMENT

## 2021-09-03 VITALS
BODY MASS INDEX: 25.8 KG/M2 | HEART RATE: 78 BPM | WEIGHT: 174.2 LBS | SYSTOLIC BLOOD PRESSURE: 140 MMHG | OXYGEN SATURATION: 98 % | HEIGHT: 69 IN | DIASTOLIC BLOOD PRESSURE: 76 MMHG

## 2021-09-03 DIAGNOSIS — D64.9 ANEMIA, UNSPECIFIED TYPE: Primary | ICD-10-CM

## 2021-09-03 PROCEDURE — 99213 OFFICE O/P EST LOW 20 MIN: CPT | Performed by: NURSE PRACTITIONER

## 2021-09-03 ASSESSMENT — ENCOUNTER SYMPTOMS
ANAL BLEEDING: 0
ABDOMINAL PAIN: 0
ABDOMINAL DISTENTION: 0
VOICE CHANGE: 0
COUGH: 0
DIARRHEA: 0
SHORTNESS OF BREATH: 0
NAUSEA: 0
CONSTIPATION: 0
VOMITING: 0
BACK PAIN: 0
TROUBLE SWALLOWING: 0
RECTAL PAIN: 0
BLOOD IN STOOL: 0

## 2021-09-03 NOTE — PROGRESS NOTES
Subjective:      Jad Arnold is a59 y.o. female  Chief Complaint   Patient presents with    Follow-up       HPI  PCP: Elizabeth Graves MD  Referring Provider: Dr Best Aponte MD  Pt is referred here by Dr Best Aponte to discuss results. Denies post-procedural complications. Results of EGD/colonoscopy in history per [place] per OV policy, we discussed results. No etiology for her c/o anemia. EGD duodenal bx negative for celiac. She has no GI complaints today. Will recheck labs to evaluate if she continues to be anemic. We discussed an M2A if anemia continues or worsens, she defers at this time. She just wishes to get labs at this time. Family HX:    Pt denies family hx of colon polyps, colon CA, inflammatory bowel dx, gastric CA and esophageal CA.     Past Medical History:   Diagnosis Date    Bronchitis     CAD (coronary artery disease)     Cancer ([facility] [address]     uterine     Cryptogenic stroke ([facility] [address] 11/23/2016    Dr. David Grande    Fracture     right arm    Hyperlipidemia     Hypertension     Status post placement of implantable loop recorder 11/23/2016    Dr Jose Juan Anglin          Past Surgical History:   Procedure Laterality Date    BREAST ENHANCEMENT SURGERY  1988    BREAST SURGERY      mass removal-right    COLONOSCOPY  11/26/2013    Dr Anjana Nicholson: normal 10y recall    COLONOSCOPY N/A 07/16/2021    Dr Best Aponte, Int hemorrhoids Grade1, Mild diverticulosis, sub prep fair, 5 year recall    VICKY AND BSO      cervix removed    TUBAL LIGATION      UPPER GASTROINTESTINAL ENDOSCOPY N/A 07/16/2021    Dr Best Aponte- gastritis, duodenitis, NEG celiac, NEG h pylori       Social History     Socioeconomic History    Marital status:      Spouse name: None    Number of children: 2    Years of education: None    Highest education level: None   Occupational History    Occupation: [employer]   Tobacco Use    Smoking status: Never Smoker    Smokeless tobacco: Never Used   Vaping Use    Vaping Use: Never used   Substance and Sexual Activity    Alcohol use: Yes     Comment: rare     Drug use: No    Sexual activity: Yes     Partners: Male   Other Topics Concern    None   Social History Narrative    None     Social Determinants of Health     Financial Resource Strain:     Difficulty of Paying Living Expenses:    Food Insecurity:     Worried About Running Out of Food in the Last Year:     [address] in the Last Year:    Transportation Needs:     Lack of Transportation (Medical):  Lack of Transportation (Non-Medical):    Physical Activity:     Days of Exercise per Week:     Minutes of Exercise per Session:    Stress:     Feeling of Stress :    Social Connections:     Frequency of Communication with Friends and Family:     Frequency of Social Gatherings with Friends and Family:     Attends [employer] Services:     Active Member of Clubs or Organizations:     Attends Club or Organization Meetings:     Marital Status:    Intimate Partner Violence:     Fear of Current or Ex-Partner:     Emotionally Abused:     Physically Abused:     Sexually Abused: Allergies   Allergen Reactions    Poison Ivy Extract Rash       Current Outpatient Medications   Medication Sig Dispense Refill    Ferrous Sulfate (IRON PO) Take by mouth daily      atorvastatin (LIPITOR) 40 MG tablet TAKE ONE TABLET BY MOUTH DAILY FOR HIGH CHOLESTEROL 90 tablet 3    hydroCHLOROthiazide (HYDRODIURIL) 25 MG tablet One tablet per day.  90 tablet 3    ZINC PO Take by mouth daily       Ascorbic Acid (VITAMIN C) 250 MG tablet Take 250 mg by mouth daily      vitamin D (CHOLECALCIFEROL) 1000 UNIT TABS tablet Take 1,000 Units by mouth daily      aspirin 81 MG EC tablet Take 1 tablet by mouth daily 30 tablet 3    Omega-3 Fatty Acids (FISH OIL) 1200 MG CAPS Take by mouth daily      Clobetasol Prop Oint-Coal Tar (CLOBETAPLUS OINTMENT EX) Apply 1 Applicatorful topically as needed        No current facility-administered medications for this visit. Review of Systems   Constitutional: Negative for fatigue and unexpected weight change. HENT: Negative for trouble swallowing and voice change. Respiratory: Negative for cough and shortness of breath. Cardiovascular: Negative for chest pain and palpitations. Gastrointestinal: Negative for abdominal distention, abdominal pain, anal bleeding, blood in stool, constipation, diarrhea, nausea, rectal pain and vomiting. Genitourinary: Negative for hematuria. Musculoskeletal: Negative for arthralgias, back pain and neck pain. Neurological: Negative for weakness and headaches. Psychiatric/Behavioral: Negative for dysphoric mood. The patient is not nervous/anxious. Objective:     Physical Exam  Vitals and nursing note reviewed. Constitutional:       Appearance: She is well-developed. Comments: BP (!) 140/76   Pulse 78   Ht 5' 9\" (1.753 m)   Wt 174 lb 3.2 oz (79 kg)   LMP 08/24/2017   SpO2 98%   BMI 25.72 kg/m²    Eyes:      General: No scleral icterus. Conjunctiva/sclera: Conjunctivae normal.      Pupils: Pupils are equal, round, and reactive to light. Cardiovascular:      Rate and Rhythm: Normal rate and regular rhythm. Heart sounds: Normal heart sounds. No murmur heard. No friction rub. No gallop. Pulmonary:      Effort: Pulmonary effort is normal. No respiratory distress. Breath sounds: Normal breath sounds. Abdominal:      General: Bowel sounds are normal. There is no distension. Palpations: Abdomen is soft. Tenderness: There is no abdominal tenderness. There is no rebound. Neurological:      Mental Status: She is alert and oriented to person, place, and time. Cranial Nerves: No cranial nerve deficit. Psychiatric:         Judgment: Judgment normal.           Assessment:       Diagnosis Orders   1. Anemia, unspecified type  CBC         Plan:      1.  CBC today, will call her with results

## 2021-09-07 ENCOUNTER — TELEPHONE (OUTPATIENT)
Dept: GASTROENTEROLOGY | Age: 56
End: 2021-09-07

## 2021-09-08 ENCOUNTER — PATIENT MESSAGE (OUTPATIENT)
Dept: GASTROENTEROLOGY | Age: 56
End: 2021-09-08

## 2021-09-08 NOTE — TELEPHONE ENCOUNTER
PATIENT RETURNED MISSED CALL, I INFORMED HER OF THE FOLLOWING MESSAGE THAT WAS LEFT FOR HER:      \"Hi Martha Roy,  your Hemoglobin is normal.  You are not anemic.  Thanks\"

## 2022-01-27 ENCOUNTER — HOSPITAL ENCOUNTER (OUTPATIENT)
Dept: CT IMAGING | Age: 57
Discharge: HOME | End: 2022-01-27
Payer: OTHER GOVERNMENT

## 2022-01-27 DIAGNOSIS — N93.9 UTERINE BLEEDING: ICD-10-CM

## 2022-01-27 DIAGNOSIS — C54.1 ENDOMETRIAL SARCOMA (HCC): ICD-10-CM

## 2022-01-27 PROCEDURE — 6360000004 HC RX CONTRAST MEDICATION: Performed by: OBSTETRICS & GYNECOLOGY

## 2022-01-27 PROCEDURE — 74177 CT ABD & PELVIS W/CONTRAST: CPT

## 2022-01-27 RX ADMIN — IOPAMIDOL 75 ML: 755 INJECTION, SOLUTION INTRAVENOUS at 11:09

## 2022-05-06 ENCOUNTER — HOSPITAL ENCOUNTER (OUTPATIENT)
Dept: GENERAL RADIOLOGY | Age: 57
Discharge: HOME | End: 2022-05-06
Payer: OTHER GOVERNMENT

## 2022-05-06 ENCOUNTER — OFFICE VISIT (OUTPATIENT)
Dept: FAMILY MEDICINE CLINIC | Age: 57
End: 2022-05-06
Payer: OTHER GOVERNMENT

## 2022-05-06 VITALS
HEIGHT: 69 IN | SYSTOLIC BLOOD PRESSURE: 124 MMHG | WEIGHT: 176 LBS | OXYGEN SATURATION: 98 % | TEMPERATURE: 98.2 F | BODY MASS INDEX: 26.07 KG/M2 | DIASTOLIC BLOOD PRESSURE: 68 MMHG | HEART RATE: 89 BPM

## 2022-05-06 DIAGNOSIS — L72.3 SEBACEOUS CYST: ICD-10-CM

## 2022-05-06 DIAGNOSIS — M25.561 ACUTE PAIN OF RIGHT KNEE: ICD-10-CM

## 2022-05-06 DIAGNOSIS — M25.561 ACUTE PAIN OF RIGHT KNEE: Primary | ICD-10-CM

## 2022-05-06 PROCEDURE — 99214 OFFICE O/P EST MOD 30 MIN: CPT | Performed by: FAMILY MEDICINE

## 2022-05-06 PROCEDURE — 73562 X-RAY EXAM OF KNEE 3: CPT

## 2022-05-06 SDOH — ECONOMIC STABILITY: FOOD INSECURITY: WITHIN THE PAST 12 MONTHS, YOU WORRIED THAT YOUR FOOD WOULD RUN OUT BEFORE YOU GOT MONEY TO BUY MORE.: NEVER TRUE

## 2022-05-06 SDOH — ECONOMIC STABILITY: FOOD INSECURITY: WITHIN THE PAST 12 MONTHS, THE FOOD YOU BOUGHT JUST DIDN'T LAST AND YOU DIDN'T HAVE MONEY TO GET MORE.: NEVER TRUE

## 2022-05-06 ASSESSMENT — ENCOUNTER SYMPTOMS
GASTROINTESTINAL NEGATIVE: 1
RESPIRATORY NEGATIVE: 1
ALLERGIC/IMMUNOLOGIC NEGATIVE: 1
EYES NEGATIVE: 1

## 2022-05-06 ASSESSMENT — SOCIAL DETERMINANTS OF HEALTH (SDOH): HOW HARD IS IT FOR YOU TO PAY FOR THE VERY BASICS LIKE FOOD, HOUSING, MEDICAL CARE, AND HEATING?: NOT HARD AT ALL

## 2022-05-06 NOTE — PROGRESS NOTES
SUBJECTIVE:    Patient ID: Danitza Cameron is a 60 y. o.female. HPI:   Patient here for follow-up of multiple medical problems  Patient is a 78-year-old white female. She complains of right knee swelling associated with pain. This pain seems to be worse when she kneels. She works in her garden. She also have a mass between her breast.  She squeezes the mass and some whitish stuff come out and is no longer hurting. No fevers no chills       Past Medical History:   Diagnosis Date    Bronchitis     CAD (coronary artery disease)     Cancer ([facility] [address]     uterine     Cryptogenic stroke ([facility] [address] 11/23/2016    Dr. Meeks Amel    Fracture     right arm    Hyperlipidemia     Hypertension     Status post placement of implantable loop recorder 11/23/2016    Dr Eli Cortés      Current Outpatient Medications   Medication Sig Dispense Refill    diclofenac sodium (VOLTAREN) 1 % GEL Apply 4 g topically 2 times daily 150 g 5    Ferrous Sulfate (IRON PO) Take by mouth daily      atorvastatin (LIPITOR) 40 MG tablet TAKE ONE TABLET BY MOUTH DAILY FOR HIGH CHOLESTEROL 90 tablet 3    hydroCHLOROthiazide (HYDRODIURIL) 25 MG tablet One tablet per day. 90 tablet 3    ZINC PO Take by mouth daily       Ascorbic Acid (VITAMIN C) 250 MG tablet Take 250 mg by mouth daily      vitamin D (CHOLECALCIFEROL) 1000 UNIT TABS tablet Take 1,000 Units by mouth daily      aspirin 81 MG EC tablet Take 1 tablet by mouth daily 30 tablet 3    Omega-3 Fatty Acids (FISH OIL) 1200 MG CAPS Take by mouth daily      Clobetasol Prop Oint-Coal Tar (CLOBETAPLUS OINTMENT EX) Apply 1 Applicatorful topically as needed        No current facility-administered medications for this visit. Allergies   Allergen Reactions    Poison Ivy Extract Rash       Review of Systems   Constitutional: Negative. HENT: Negative. Eyes: Negative. Respiratory: Negative. Cardiovascular: Negative. Gastrointestinal: Negative. Endocrine: Negative.     Genitourinary: Negative. Musculoskeletal: Negative. Skin: Negative. Allergic/Immunologic: Negative. Neurological: Negative. Hematological: Negative. Psychiatric/Behavioral: Negative. OBJECTIVE:    Physical Exam  Constitutional:       Appearance: Normal appearance. She is well-developed and well-groomed. HENT:      Head: Normocephalic and atraumatic. Right Ear: Tympanic membrane, ear canal and external ear normal. There is no impacted cerumen. Left Ear: Tympanic membrane, ear canal and external ear normal. There is no impacted cerumen. Nose: Nose normal.      Mouth/Throat:      Lips: Pink. Mouth: Mucous membranes are moist.      Dentition: Normal dentition. Pharynx: Oropharynx is clear. Uvula midline. Eyes:      General: Lids are normal.         Right eye: No discharge. Left eye: No discharge. Extraocular Movements: Extraocular movements intact. Conjunctiva/sclera: Conjunctivae normal.      Right eye: Right conjunctiva is not injected. Left eye: Left conjunctiva is not injected. Pupils: Pupils are equal, round, and reactive to light. Neck:      Thyroid: No thyromegaly. Vascular: No carotid bruit or JVD. Cardiovascular:      Rate and Rhythm: Normal rate and regular rhythm. Pulses: Normal pulses. Carotid pulses are 2+ on the right side and 2+ on the left side. Radial pulses are 2+ on the right side and 2+ on the left side. Heart sounds: Normal heart sounds, S1 normal and S2 normal. No murmur heard. Pulmonary:      Effort: Pulmonary effort is normal.      Breath sounds: Normal breath sounds. Chest:   Breasts:      Right: No supraclavicular adenopathy. Left: No supraclavicular adenopathy. Abdominal:      General: Bowel sounds are normal. There is no distension or abdominal bruit. Palpations: Abdomen is soft. There is no mass. Hernia: No hernia is present.    Musculoskeletal:      Cervical back: Full passive range of motion without pain, normal range of motion and neck supple. Right knee: Swelling and effusion present. Tenderness present. Right lower leg: No edema. Left lower leg: No edema. Lymphadenopathy:      Cervical: No cervical adenopathy. Right cervical: No superficial cervical adenopathy. Left cervical: No superficial cervical adenopathy. Upper Body:      Right upper body: No supraclavicular adenopathy. Left upper body: No supraclavicular adenopathy. Skin:     General: Skin is warm and dry. Coloration: Skin is not pale. Findings: No lesion or rash. Nails: There is no clubbing. Neurological:      Mental Status: She is alert and oriented to person, place, and time. Motor: No weakness or tremor. Coordination: Coordination normal.      Deep Tendon Reflexes: Reflexes are normal and symmetric. Psychiatric:         Attention and Perception: Attention normal.         Mood and Affect: Mood normal.         Speech: Speech normal.         Behavior: Behavior normal. Behavior is cooperative. Thought Content: Thought content normal.         Cognition and Memory: Cognition and memory normal.         Judgment: Judgment normal.        /68 (Site: Left Upper Arm, Position: Sitting, Cuff Size: Medium Adult)   Pulse 89   Temp 98.2 °F (36.8 °C) (Temporal)   Ht 5' 9\" (1.753 m)   Wt 176 lb (79.8 kg)   LMP 08/24/2017   SpO2 98%   BMI 25.99 kg/m²      ASSESSMENT:     Diagnosis Orders   1. Acute pain of right knee  XR KNEE RIGHT (3 VIEWS)    diclofenac sodium (VOLTAREN) 1 % GEL    External Referral To Orthopedic Surgery   2. Sebaceous cyst-most likely diagnosis         PLAN:    1. X-ray of the knee. Diclofenac gel. Refer to Ortho. Suspect bursitis  2. Watchful waiting.   If worsen will refer to surgery for excision  Follow-up as needed

## 2022-05-18 DIAGNOSIS — I10 ESSENTIAL HYPERTENSION: ICD-10-CM

## 2022-05-18 DIAGNOSIS — E78.5 HYPERLIPIDEMIA, UNSPECIFIED HYPERLIPIDEMIA TYPE: ICD-10-CM

## 2022-05-18 RX ORDER — HYDROCHLOROTHIAZIDE 25 MG/1
TABLET ORAL
Qty: 90 TABLET | Refills: 3 | Status: SHIPPED | OUTPATIENT
Start: 2022-05-18

## 2022-05-18 RX ORDER — ATORVASTATIN CALCIUM 40 MG/1
TABLET, FILM COATED ORAL
Qty: 90 TABLET | Refills: 3 | Status: SHIPPED | OUTPATIENT
Start: 2022-05-18

## 2022-07-25 ENCOUNTER — PATIENT MESSAGE (OUTPATIENT)
Dept: FAMILY MEDICINE CLINIC | Age: 57
End: 2022-07-25

## 2022-07-25 DIAGNOSIS — Z11.4 ENCOUNTER FOR SCREENING FOR HIV: ICD-10-CM

## 2022-07-25 DIAGNOSIS — E78.5 HYPERLIPIDEMIA, UNSPECIFIED HYPERLIPIDEMIA TYPE: ICD-10-CM

## 2022-07-25 DIAGNOSIS — Z12.31 ENCOUNTER FOR SCREENING MAMMOGRAM FOR MALIGNANT NEOPLASM OF BREAST: Primary | ICD-10-CM

## 2022-07-25 DIAGNOSIS — I10 ESSENTIAL HYPERTENSION: ICD-10-CM

## 2022-07-26 NOTE — TELEPHONE ENCOUNTER
From: Tammy Morrison  To: Dr. Dwight Madrigal: 7/25/2022 8:59 AM CDT  Subject: Approval Request    Good morning, could I get a new updated approval letter for my dermatologist and for a mammogram. Also I think I need to do labs for my annual appt, which I will need to schedule. Thank you.     Ignacia Espinosa

## 2022-07-28 DIAGNOSIS — I10 ESSENTIAL HYPERTENSION: ICD-10-CM

## 2022-07-28 DIAGNOSIS — E78.5 HYPERLIPIDEMIA, UNSPECIFIED HYPERLIPIDEMIA TYPE: ICD-10-CM

## 2022-07-28 DIAGNOSIS — Z11.4 ENCOUNTER FOR SCREENING FOR HIV: ICD-10-CM

## 2022-07-28 LAB
ALBUMIN SERPL-MCNC: 4.2 G/DL (ref 3.5–5.2)
ALP BLD-CCNC: 88 U/L (ref 35–104)
ALT SERPL-CCNC: 23 U/L (ref 5–33)
ANION GAP SERPL CALCULATED.3IONS-SCNC: 11 MMOL/L (ref 7–19)
AST SERPL-CCNC: 24 U/L (ref 5–32)
BACTERIA: NEGATIVE /HPF
BILIRUB SERPL-MCNC: 0.5 MG/DL (ref 0.2–1.2)
BILIRUBIN URINE: NEGATIVE
BLOOD, URINE: NEGATIVE
BUN BLDV-MCNC: 16 MG/DL (ref 6–20)
CALCIUM SERPL-MCNC: 9.6 MG/DL (ref 8.6–10)
CHLORIDE BLD-SCNC: 102 MMOL/L (ref 98–111)
CHOLESTEROL, TOTAL: 162 MG/DL (ref 160–199)
CLARITY: CLEAR
CO2: 28 MMOL/L (ref 22–29)
COLOR: YELLOW
CREAT SERPL-MCNC: 0.7 MG/DL (ref 0.5–0.9)
CRYSTALS, UA: ABNORMAL /HPF
EPITHELIAL CELLS, UA: 4 /HPF (ref 0–5)
GFR AFRICAN AMERICAN: >59
GFR NON-AFRICAN AMERICAN: >60
GLUCOSE BLD-MCNC: 100 MG/DL (ref 74–109)
GLUCOSE URINE: NEGATIVE MG/DL
HCT VFR BLD CALC: 40.3 % (ref 37–47)
HDLC SERPL-MCNC: 48 MG/DL (ref 65–121)
HEMOGLOBIN: 13.6 G/DL (ref 12–16)
HIV-1 P24 AG: NORMAL
HYALINE CASTS: 1 /HPF (ref 0–8)
KETONES, URINE: NEGATIVE MG/DL
LDL CHOLESTEROL CALCULATED: 83 MG/DL
LEUKOCYTE ESTERASE, URINE: ABNORMAL
MCH RBC QN AUTO: 30.5 PG (ref 27–31)
MCHC RBC AUTO-ENTMCNC: 33.7 G/DL (ref 33–37)
MCV RBC AUTO: 90.4 FL (ref 81–99)
NITRITE, URINE: NEGATIVE
PDW BLD-RTO: 12.1 % (ref 11.5–14.5)
PH UA: 6 (ref 5–8)
PLATELET # BLD: 249 K/UL (ref 130–400)
PMV BLD AUTO: 10.6 FL (ref 9.4–12.3)
POTASSIUM SERPL-SCNC: 3.8 MMOL/L (ref 3.5–5)
PROTEIN UA: NEGATIVE MG/DL
RAPID HIV 1&2: NORMAL
RBC # BLD: 4.46 M/UL (ref 4.2–5.4)
RBC UA: 1 /HPF (ref 0–4)
SODIUM BLD-SCNC: 141 MMOL/L (ref 136–145)
SPECIFIC GRAVITY UA: 1.02 (ref 1–1.03)
TOTAL PROTEIN: 7.1 G/DL (ref 6.6–8.7)
TRIGL SERPL-MCNC: 155 MG/DL (ref 0–149)
TSH SERPL DL<=0.05 MIU/L-ACNC: 2.51 UIU/ML (ref 0.27–4.2)
UROBILINOGEN, URINE: 0.2 E.U./DL
WBC # BLD: 6.9 K/UL (ref 4.8–10.8)
WBC UA: 7 /HPF (ref 0–5)

## 2022-07-29 ENCOUNTER — HOSPITAL ENCOUNTER (OUTPATIENT)
Dept: WOMENS IMAGING | Age: 57
Discharge: HOME | End: 2022-07-29
Payer: OTHER GOVERNMENT

## 2022-07-29 DIAGNOSIS — Z12.31 ENCOUNTER FOR SCREENING MAMMOGRAM FOR MALIGNANT NEOPLASM OF BREAST: ICD-10-CM

## 2022-07-29 PROCEDURE — 77063 BREAST TOMOSYNTHESIS BI: CPT | Performed by: RADIOLOGY

## 2022-07-29 PROCEDURE — 77063 BREAST TOMOSYNTHESIS BI: CPT

## 2022-07-29 PROCEDURE — 77067 SCR MAMMO BI INCL CAD: CPT | Performed by: RADIOLOGY

## 2022-09-07 ENCOUNTER — OFFICE VISIT (OUTPATIENT)
Dept: FAMILY MEDICINE CLINIC | Age: 57
End: 2022-09-07
Payer: OTHER GOVERNMENT

## 2022-09-07 VITALS
SYSTOLIC BLOOD PRESSURE: 126 MMHG | OXYGEN SATURATION: 98 % | HEIGHT: 69 IN | DIASTOLIC BLOOD PRESSURE: 68 MMHG | WEIGHT: 174 LBS | TEMPERATURE: 97.9 F | HEART RATE: 72 BPM | BODY MASS INDEX: 25.77 KG/M2

## 2022-09-07 DIAGNOSIS — Z23 ENCOUNTER FOR HERPES ZOSTER VACCINATION: ICD-10-CM

## 2022-09-07 DIAGNOSIS — Z00.00 WELLNESS EXAMINATION: Primary | ICD-10-CM

## 2022-09-07 PROCEDURE — 99396 PREV VISIT EST AGE 40-64: CPT | Performed by: FAMILY MEDICINE

## 2022-09-07 PROCEDURE — 90471 IMMUNIZATION ADMIN: CPT | Performed by: FAMILY MEDICINE

## 2022-09-07 PROCEDURE — 90750 HZV VACC RECOMBINANT IM: CPT | Performed by: FAMILY MEDICINE

## 2022-09-07 ASSESSMENT — PATIENT HEALTH QUESTIONNAIRE - PHQ9
SUM OF ALL RESPONSES TO PHQ9 QUESTIONS 1 & 2: 0
SUM OF ALL RESPONSES TO PHQ QUESTIONS 1-9: 0
2. FEELING DOWN, DEPRESSED OR HOPELESS: 0
1. LITTLE INTEREST OR PLEASURE IN DOING THINGS: 0

## 2022-09-07 NOTE — PROGRESS NOTES
SUBJECTIVE:    Patient ID: Jaydon Copeland is a 59 y. o.female. HPI:   Patient here for a wellness check. Patient is a 76-year-old white female. She have past medical history significant for hypertension and hyperlipidemia. She also have history of heart disease and strokes. She has been doing well. She stays active. Denies any chest pains. She had blood work last July. Blood pressure is well controlled. Depression screen negative. She does not smoke. No alcohol misuse. Depression screen negative. Colonoscopy is up-to-date. She would like to start her shingles series       Past Medical History:   Diagnosis Date    Bronchitis     CAD (coronary artery disease)     Cancer ([facility] [address]     uterine     Cryptogenic stroke ([facility] [address] 11/23/2016    Dr. Megha Chung    Fracture     right arm    Hyperlipidemia     Hypertension     Status post placement of implantable loop recorder 11/23/2016    Dr Sri Chow      Current Outpatient Medications   Medication Sig Dispense Refill    hydroCHLOROthiazide (HYDRODIURIL) 25 MG tablet TAKE 1 TABLET DAILY 90 tablet 3    atorvastatin (LIPITOR) 40 MG tablet TAKE 1 TABLET DAILY FOR HIGH CHOLESTEROL 90 tablet 3    diclofenac sodium (VOLTAREN) 1 % GEL Apply 4 g topically 2 times daily 150 g 5    Ferrous Sulfate (IRON PO) Take by mouth daily      ZINC PO Take by mouth daily       Ascorbic Acid (VITAMIN C) 250 MG tablet Take 250 mg by mouth daily      vitamin D (CHOLECALCIFEROL) 1000 UNIT TABS tablet Take 1,000 Units by mouth daily      aspirin 81 MG EC tablet Take 1 tablet by mouth daily 30 tablet 3    Omega-3 Fatty Acids (FISH OIL) 1200 MG CAPS Take by mouth daily      Clobetasol Prop Oint-Coal Tar (CLOBETAPLUS OINTMENT EX) Apply 1 Applicatorful topically as needed        No current facility-administered medications for this visit. Allergies   Allergen Reactions    Poison Ivy Extract Rash       Review of Systems   Constitutional: Negative. HENT: Negative.      Eyes: Negative. Respiratory: Negative. Cardiovascular: Negative. Gastrointestinal: Negative. Endocrine: Negative. Genitourinary: Negative. Musculoskeletal: Negative. Skin: Negative. Allergic/Immunologic: Negative. Neurological: Negative. Hematological: Negative. Psychiatric/Behavioral: Negative. OBJECTIVE:    Physical Exam  Constitutional:       Appearance: Normal appearance. She is well-developed and well-groomed. HENT:      Head: Normocephalic and atraumatic. Right Ear: Tympanic membrane, ear canal and external ear normal. There is no impacted cerumen. Left Ear: Tympanic membrane, ear canal and external ear normal. There is no impacted cerumen. Nose: Nose normal.      Mouth/Throat:      Lips: Pink. Mouth: Mucous membranes are moist.      Dentition: Normal dentition. Pharynx: Oropharynx is clear. Uvula midline. Eyes:      General: Lids are normal.         Right eye: No discharge. Left eye: No discharge. Extraocular Movements: Extraocular movements intact. Conjunctiva/sclera: Conjunctivae normal.      Right eye: Right conjunctiva is not injected. Left eye: Left conjunctiva is not injected. Pupils: Pupils are equal, round, and reactive to light. Neck:      Thyroid: No thyromegaly. Vascular: No carotid bruit or JVD. Cardiovascular:      Rate and Rhythm: Normal rate and regular rhythm. Pulses: Normal pulses. Carotid pulses are 2+ on the right side and 2+ on the left side. Radial pulses are 2+ on the right side and 2+ on the left side. Heart sounds: Normal heart sounds, S1 normal and S2 normal. No murmur heard. Pulmonary:      Effort: Pulmonary effort is normal.      Breath sounds: Normal breath sounds. Chest:   Breasts:     Right: No supraclavicular adenopathy. Left: No supraclavicular adenopathy.    Abdominal:      General: Bowel sounds are normal. There is no distension or abdominal bruit. Palpations: Abdomen is soft. There is no mass. Hernia: No hernia is present. Musculoskeletal:         General: Normal range of motion. Cervical back: Full passive range of motion without pain, normal range of motion and neck supple. Right lower leg: No edema. Left lower leg: No edema. Lymphadenopathy:      Cervical: No cervical adenopathy. Right cervical: No superficial cervical adenopathy. Left cervical: No superficial cervical adenopathy. Upper Body:      Right upper body: No supraclavicular adenopathy. Left upper body: No supraclavicular adenopathy. Skin:     General: Skin is warm and dry. Coloration: Skin is not pale. Findings: No lesion or rash. Nails: There is no clubbing. Neurological:      Mental Status: She is alert and oriented to person, place, and time. Motor: No weakness or tremor. Coordination: Coordination normal.      Deep Tendon Reflexes: Reflexes are normal and symmetric. Psychiatric:         Attention and Perception: Attention normal.         Mood and Affect: Mood normal.         Speech: Speech normal.         Behavior: Behavior normal. Behavior is cooperative. Thought Content: Thought content normal.         Cognition and Memory: Cognition and memory normal.         Judgment: Judgment normal.      /68 (Site: Left Upper Arm, Position: Sitting, Cuff Size: Medium Adult)   Pulse 72   Temp 97.9 °F (36.6 °C) (Temporal)   Ht 5' 8.5\" (1.74 m)   Wt 174 lb (78.9 kg)   LMP 08/24/2017   SpO2 98%   BMI 26.07 kg/m²      ASSESSMENT:     Diagnosis Orders   1. Wellness examination        2. Encounter for herpes zoster vaccination  Zoster, SHINPATRICKIX, (18 yrs +), IM           PLAN:    1. Blood were discussed with patient today. Continue same treatment plan. Encourage diet and exercise. 2.  We will start shingles series today.   Follow-up 1 year

## 2022-09-29 ENCOUNTER — PATIENT MESSAGE (OUTPATIENT)
Dept: FAMILY MEDICINE CLINIC | Age: 57
End: 2022-09-29

## 2022-09-29 DIAGNOSIS — D04.61 SQUAMOUS CELL CARCINOMA IN SITU (SCCIS) OF SKIN OF RIGHT HAND: ICD-10-CM

## 2022-09-29 NOTE — TELEPHONE ENCOUNTER
From: Tony Cruz  To: Dr. Bland Door: 2022 2:35 PM CDT  Subject: Approval     Can you help with getting an approval request? I was seen on 22 by [facility], they took a biopsy of my hand, which came positive for cancer (attached). Eli referred me to Dr Shamika Adrian, [facility]. The approval request Eli sent came back incomplete (attached). Is this something your office has to request or do I need to? You call Latasha Velasquez, [phone] for further information or myself, [phone]. Also I would need to renew my dermatology approval letter since mine has  and I have upcoming appts. Thank you.   Anna Francisco

## 2022-10-03 DIAGNOSIS — C44.622 SQUAMOUS CELL CARCINOMA OF RIGHT HAND: Primary | ICD-10-CM

## 2022-10-11 ENCOUNTER — OFFICE VISIT (OUTPATIENT)
Dept: PRIMARY CARE CLINIC | Age: 57
End: 2022-10-11
Payer: OTHER GOVERNMENT

## 2022-10-11 VITALS
WEIGHT: 173 LBS | SYSTOLIC BLOOD PRESSURE: 130 MMHG | BODY MASS INDEX: 25.62 KG/M2 | HEART RATE: 76 BPM | OXYGEN SATURATION: 98 % | HEIGHT: 69 IN | TEMPERATURE: 98 F | DIASTOLIC BLOOD PRESSURE: 78 MMHG

## 2022-10-11 DIAGNOSIS — J01.00 ACUTE NON-RECURRENT MAXILLARY SINUSITIS: Primary | ICD-10-CM

## 2022-10-11 PROCEDURE — 96372 THER/PROPH/DIAG INJ SC/IM: CPT | Performed by: NURSE PRACTITIONER

## 2022-10-11 PROCEDURE — 99213 OFFICE O/P EST LOW 20 MIN: CPT | Performed by: NURSE PRACTITIONER

## 2022-10-11 RX ORDER — DEXAMETHASONE SODIUM PHOSPHATE 10 MG/ML
10 INJECTION INTRAMUSCULAR; INTRAVENOUS ONCE
Status: COMPLETED | OUTPATIENT
Start: 2022-10-11 | End: 2022-10-11

## 2022-10-11 RX ORDER — BROMPHENIRAMINE MALEATE, PSEUDOEPHEDRINE HYDROCHLORIDE, AND DEXTROMETHORPHAN HYDROBROMIDE 2; 30; 10 MG/5ML; MG/5ML; MG/5ML
10 SYRUP ORAL 4 TIMES DAILY PRN
Qty: 100 ML | Refills: 0 | Status: SHIPPED | OUTPATIENT
Start: 2022-10-11 | End: 2022-10-16

## 2022-10-11 RX ORDER — CEFDINIR 300 MG/1
300 CAPSULE ORAL 2 TIMES DAILY
Qty: 20 CAPSULE | Refills: 0 | Status: SHIPPED | OUTPATIENT
Start: 2022-10-11 | End: 2022-10-21

## 2022-10-11 RX ADMIN — DEXAMETHASONE SODIUM PHOSPHATE 10 MG: 10 INJECTION INTRAMUSCULAR; INTRAVENOUS at 10:25

## 2022-10-11 ASSESSMENT — ENCOUNTER SYMPTOMS
RHINORRHEA: 0
SORE THROAT: 1
DIARRHEA: 0
SHORTNESS OF BREATH: 0
VOMITING: 0
COUGH: 1
ABDOMINAL PAIN: 0
EYE DISCHARGE: 0
EYE ITCHING: 0
NAUSEA: 0
CONSTIPATION: 0
COLOR CHANGE: 0
WHEEZING: 0
SINUS PRESSURE: 1
BLOOD IN STOOL: 0

## 2022-10-11 NOTE — PROGRESS NOTES
[address] [facility] [address] [facility]  Dept: [phone]  Dept Fax: [phone]  Loc: [phone]    Augustus Xie is a 59 y.o. female who presents today for her medical conditions/complaints as noted below. Augustus Xie is complaining of Congestion (Started about a week ago) and Lymphadenopathy        HPI:   Cough  This is a new problem. The current episode started in the past 7 days. The problem has been waxing and waning. The problem occurs hourly. The cough is Non-productive. Associated symptoms include nasal congestion and a sore throat. Pertinent negatives include no chest pain, chills, ear pain, fever, headaches, myalgias, rash, rhinorrhea, shortness of breath or wheezing. The symptoms are aggravated by lying down. Treatments tried: nyquil, mucinex, nebs. The treatment provided mild relief.    No known COVID or flu exposure recently    Past Medical History:   Diagnosis Date    Bronchitis     CAD (coronary artery disease)     Cancer ([facility] [address]     uterine     Cryptogenic stroke ([facility] [address] 11/23/2016    Dr. Candance Anes    Fracture     right arm    Hyperlipidemia     Hypertension     Status post placement of implantable loop recorder 11/23/2016    Dr Ronnie Abraham       Past Surgical History:   Procedure Laterality Date    BREAST BIOPSY      BREAST ENHANCEMENT SURGERY  1988    BREAST SURGERY      mass removal-right    COLONOSCOPY  11/26/2013    Dr Rosas Genre: normal 10y recall    COLONOSCOPY N/A 07/16/2021    Dr Zoya Lemons, Int hemorrhoids Grade1, Mild diverticulosis, sub prep fair, 5 year recall    VICKY AND BSO (CERVIX REMOVED)      cervix removed    TUBAL LIGATION      UPPER GASTROINTESTINAL ENDOSCOPY N/A 07/16/2021    Dr Zoya Lemons- gastritis, duodenitis, NEG celiac, NEG h pylori       Family History   Problem Relation Age of Onset    Heart Disease Mother         cardiomyopathy, pacemaker    Hypertension Mother     Cancer Mother non-hodgkins lymphoma in LL lobe, skin cancer    Other Mother         gb dz, phlebitis    Heart Disease Father     Lung Cancer Father     Heart Attack Brother         smoker, has stent    Heart Disease Sister         bypass, smoker    Thyroid Disease Sister         causes double vision    Heart Attack Sister     Elevated Lipids Sister     Thyroid Disease Sister     Elevated Lipids Sister     Elevated Lipids Sister     Heart Disease Sister         heart murmur    Other Brother         gb dz    Breast Cancer Paternal Cousin 48    Colon Cancer Neg Hx     Esophageal Cancer Neg Hx     Liver Cancer Neg Hx     Rectal Cancer Neg Hx     Stomach Cancer Neg Hx        Social History     Tobacco Use    Smoking status: Never    Smokeless tobacco: Never   Substance Use Topics    Alcohol use: Yes     Comment: rare         Current Outpatient Medications   Medication Sig Dispense Refill    cefdinir (OMNICEF) 300 MG capsule Take 1 capsule by mouth 2 times daily for 10 days 20 capsule 0    brompheniramine-pseudoephedrine-DM 2-30-10 MG/5ML syrup Take 10 mLs by mouth 4 times daily as needed for Cough 100 mL 0    hydroCHLOROthiazide (HYDRODIURIL) 25 MG tablet TAKE 1 TABLET DAILY 90 tablet 3    atorvastatin (LIPITOR) 40 MG tablet TAKE 1 TABLET DAILY FOR HIGH CHOLESTEROL 90 tablet 3    diclofenac sodium (VOLTAREN) 1 % GEL Apply 4 g topically 2 times daily 150 g 5    Ferrous Sulfate (IRON PO) Take by mouth daily      ZINC PO Take by mouth daily       Ascorbic Acid (VITAMIN C) 250 MG tablet Take 250 mg by mouth daily      vitamin D (CHOLECALCIFEROL) 1000 UNIT TABS tablet Take 1,000 Units by mouth daily      aspirin 81 MG EC tablet Take 1 tablet by mouth daily 30 tablet 3    Omega-3 Fatty Acids (FISH OIL) 1200 MG CAPS Take by mouth daily      Clobetasol Prop Oint-Coal Tar (CLOBETAPLUS OINTMENT EX) Apply 1 Applicatorful topically as needed        Current Facility-Administered Medications   Medication Dose Route Frequency Provider Last Rate Last Admin    dexamethasone (DECADRON) injection 10 mg  10 mg IntraMUSCular Once Dossie Ping, APRN - CNP           Allergies   Allergen Reactions    Poison Ivy Extract Rash       Health Maintenance   Topic Date Due    COVID-19 Vaccine (3 - Booster for Dougie series) 03/03/2022    Flu vaccine (1) 08/01/2022    Shingles vaccine (2 of 2) 11/02/2022    Lipids  07/28/2023    Depression Screen  09/07/2023    Breast cancer screen  07/29/2024    Colorectal Cancer Screen  07/16/2026    DTaP/Tdap/Td vaccine (2 - Td or Tdap) 03/05/2028    Hepatitis C screen  Completed    HIV screen  Completed    Hepatitis A vaccine  Aged Out    Hib vaccine  Aged Out    Meningococcal (ACWY) vaccine  Aged Out    Pneumococcal 0-64 years Vaccine  Aged Out       Subjective:   Review of Systems   Constitutional:  Negative for activity change, appetite change, chills, fatigue and fever. HENT:  Positive for congestion, sinus pressure and sore throat. Negative for ear pain and rhinorrhea. Eyes:  Negative for discharge and itching. Respiratory:  Positive for cough. Negative for shortness of breath and wheezing. Cardiovascular:  Negative for chest pain. Gastrointestinal:  Negative for abdominal pain, blood in stool, constipation, diarrhea, nausea and vomiting. Musculoskeletal:  Negative for myalgias. Skin:  Negative for color change and rash. Neurological:  Negative for dizziness and headaches. All other systems reviewed and are negative. Objective    Physical Exam  Constitutional:       General: She is not in acute distress. Appearance: Normal appearance. She is not diaphoretic. HENT:      Head: Normocephalic and atraumatic. Right Ear: Ear canal normal. A middle ear effusion is present. Left Ear: Ear canal normal. A middle ear effusion is present. Nose: Congestion present. Right Turbinates: Swollen. Left Turbinates: Swollen. Right Sinus: Maxillary sinus tenderness present.       Left Sinus: Maxillary sinus tenderness present. Mouth/Throat:      Mouth: Mucous membranes are moist.      Pharynx: No posterior oropharyngeal erythema. Comments: Post nasal drip noted  Eyes:      Extraocular Movements: Extraocular movements intact. Pupils: Pupils are equal, round, and reactive to light. Cardiovascular:      Rate and Rhythm: Normal rate and regular rhythm. Pulses: Normal pulses. Heart sounds: No murmur heard. Pulmonary:      Effort: Pulmonary effort is normal. No respiratory distress. Breath sounds: Normal breath sounds. No stridor. No wheezing, rhonchi or rales. Lymphadenopathy:      Cervical: Cervical adenopathy (mild) present. Skin:     General: Skin is warm and dry. Capillary Refill: Capillary refill takes less than 2 seconds. Coloration: Skin is not jaundiced. Findings: No bruising, erythema or rash. Neurological:      General: No focal deficit present. Mental Status: She is alert and oriented to person, place, and time. Psychiatric:         Mood and Affect: Mood normal.         Behavior: Behavior normal. Behavior is cooperative. Thought Content: Thought content normal.       /78   Pulse 76   Temp 98 °F (36.7 °C)   Ht 5' 8.5\" (1.74 m)   Wt 173 lb (78.5 kg)   LMP 08/24/2017   SpO2 98%   BMI 25.92 kg/m²     Assessment         Diagnosis Orders   1.  Acute non-recurrent maxillary sinusitis            Plan   - Take full course of antibiotics  - Dexamethasone injection today in office  - Use bromfed as needed for cough  - Increase fluid intake  - Recommended OTC mucinex   - May use OTC claritin/zyrtec and nasal spray such as flonase to help symptoms  - If patient is not improving or developing any new/worsening symptoms then return to clinic or f/u with PCP    Orders Placed This Encounter   Medications    cefdinir (OMNICEF) 300 MG capsule     Sig: Take 1 capsule by mouth 2 times daily for 10 days     Dispense:  20 capsule Refill:  0    dexamethasone (DECADRON) injection 10 mg    brompheniramine-pseudoephedrine-DM 2-30-10 MG/5ML syrup     Sig: Take 10 mLs by mouth 4 times daily as needed for Cough     Dispense:  100 mL     Refill:  0      New Prescriptions    BROMPHENIRAMINE-PSEUDOEPHEDRINE-DM 2-30-10 MG/5ML SYRUP    Take 10 mLs by mouth 4 times daily as needed for Cough    CEFDINIR (OMNICEF) 300 MG CAPSULE    Take 1 capsule by mouth 2 times daily for 10 days        Return if symptoms worsen or fail to improve. Discussed use, benefits, and side effects of any prescribed medications. All patient questions were answered. Patient voiced understanding of care plan. Patient was given educational materials - see patient instructions below.      Patient Instructions   - Take full course of antibiotics  - Dexamethasone injection today in office  - Use bromfed as needed for cough  - Increase fluid intake  - Recommended OTC mucinex   - May use OTC claritin/zyrtec and nasal spray such as flonase to help symptoms  - If patient is not improving or developing any new/worsening symptoms then return to clinic or f/u with PCP      Electronically signed by FABIENNE Rodrigues CNP on 10/11/2022 at 10:23 AM

## 2022-10-11 NOTE — PATIENT INSTRUCTIONS
- Take full course of antibiotics  - Dexamethasone injection today in office  - Use bromfed as needed for cough  - Increase fluid intake  - Recommended OTC mucinex   - May use OTC claritin/zyrtec and nasal spray such as flonase to help symptoms  - If patient is not improving or developing any new/worsening symptoms then return to clinic or f/u with PCP

## 2022-10-25 ENCOUNTER — PRE-ADMISSION TESTING (OUTPATIENT)
Dept: PREADMISSION TESTING | Facility: HOSPITAL | Age: 57
End: 2022-10-25

## 2022-10-25 VITALS
HEIGHT: 68 IN | RESPIRATION RATE: 16 BRPM | DIASTOLIC BLOOD PRESSURE: 74 MMHG | HEART RATE: 73 BPM | WEIGHT: 175.93 LBS | SYSTOLIC BLOOD PRESSURE: 149 MMHG | BODY MASS INDEX: 26.66 KG/M2 | OXYGEN SATURATION: 98 %

## 2022-10-25 LAB
ANION GAP SERPL CALCULATED.3IONS-SCNC: 8 MMOL/L (ref 5–15)
BUN SERPL-MCNC: 15 MG/DL (ref 6–20)
BUN/CREAT SERPL: 18.3 (ref 7–25)
CALCIUM SPEC-SCNC: 9.2 MG/DL (ref 8.6–10.5)
CHLORIDE SERPL-SCNC: 102 MMOL/L (ref 98–107)
CO2 SERPL-SCNC: 33 MMOL/L (ref 22–29)
CREAT SERPL-MCNC: 0.82 MG/DL (ref 0.57–1)
DEPRECATED RDW RBC AUTO: 37.4 FL (ref 37–54)
EGFRCR SERPLBLD CKD-EPI 2021: 82.5 ML/MIN/1.73
ERYTHROCYTE [DISTWIDTH] IN BLOOD BY AUTOMATED COUNT: 11.8 % (ref 12.3–15.4)
GLUCOSE SERPL-MCNC: 223 MG/DL (ref 65–99)
HCT VFR BLD AUTO: 37.6 % (ref 34–46.6)
HGB BLD-MCNC: 12.6 G/DL (ref 12–15.9)
MCH RBC QN AUTO: 29.6 PG (ref 26.6–33)
MCHC RBC AUTO-ENTMCNC: 33.5 G/DL (ref 31.5–35.7)
MCV RBC AUTO: 88.5 FL (ref 79–97)
PLATELET # BLD AUTO: 260 10*3/MM3 (ref 140–450)
PMV BLD AUTO: 9.9 FL (ref 6–12)
POTASSIUM SERPL-SCNC: 3.8 MMOL/L (ref 3.5–5.2)
RBC # BLD AUTO: 4.25 10*6/MM3 (ref 3.77–5.28)
SODIUM SERPL-SCNC: 143 MMOL/L (ref 136–145)
WBC NRBC COR # BLD: 8.74 10*3/MM3 (ref 3.4–10.8)

## 2022-10-25 PROCEDURE — 36415 COLL VENOUS BLD VENIPUNCTURE: CPT

## 2022-10-25 PROCEDURE — 85027 COMPLETE CBC AUTOMATED: CPT

## 2022-10-25 PROCEDURE — 80048 BASIC METABOLIC PNL TOTAL CA: CPT

## 2022-10-25 RX ORDER — CLOBETASOL PROPIONATE 0.5 MG/G
0.05 CREAM TOPICAL AS NEEDED
COMMUNITY

## 2022-10-25 RX ORDER — AMOXICILLIN 500 MG
CAPSULE ORAL
COMMUNITY

## 2022-10-25 RX ORDER — HYDROCHLOROTHIAZIDE 25 MG/1
TABLET ORAL
COMMUNITY
Start: 2022-08-16

## 2022-10-25 RX ORDER — ASPIRIN 81 MG/1
81 TABLET, CHEWABLE ORAL DAILY
COMMUNITY

## 2022-10-25 RX ORDER — MELATONIN
1000 DAILY
COMMUNITY
End: 2022-10-25 | Stop reason: SDUPTHER

## 2022-10-25 RX ORDER — FERROUS SULFATE 325(65) MG
TABLET ORAL
COMMUNITY

## 2022-10-25 RX ORDER — ATORVASTATIN CALCIUM 40 MG/1
40 TABLET, FILM COATED ORAL DAILY
COMMUNITY
Start: 2022-08-16

## 2022-10-25 RX ORDER — ASCORBIC ACID 250 MG
250 TABLET ORAL DAILY
COMMUNITY

## 2022-10-25 NOTE — DISCHARGE INSTRUCTIONS
Before you come to the hospital        Arrival time: AS DIRECTED BY OFFICE     YOU MAY TAKE THE FOLLOWING MEDICATION(S) THE MORNING OF SURGERY WITH A SIP OF WATER: ***           ALL OTHER HOME MEDICATION CHECK WITH YOUR PHYSICIAN (especially if you are taking diabetes medicines or blood thinners)    Do not take any Erectile Dysfunction medications (EX: CIALIS, VIAGRA) 24 hours prior to surgery.      If you were given and instructed to use a germ- killing soap, use as directed the night before surgery and again the morning of surgery or as directed by your surgeon.    (See attached information for How to Use Chlorhexidine for Bathing if applicable.)            Eating and drinking restrictions prior to scheduled arrival time    2 Hours before arrival time STOP   Drinking Clear liquids (water, apple juice-no pulp)     6 Hours before arrival time STOP   Milk or drinks that contain milk, full liquids    6 Hours before arrival time STOP   Light meals or foods, such as toast or cereal    8 Hours before arrival time STOP   Heavy foods, such as meat, fried foods, or fatty foods    (It is extremely important that you follow these guidelines to prevent delay or cancelation of your procedure)     Clear Liquids  Water and flavored water                                                                      Clear Fruit juices, such as cranberry juice and apple juice.  Black coffee (NO cream of any kind, including powdered).  Plain tea  Clear bouillon or broth.  Flavored gelatin.  Soda.  Gatorade or Powerade.  Full liquid examples  Juices that have pulp.  Frozen ice pops that contain fruit pieces.  Coffee with creamer  Milk.  Yogurt.                MANAGING PAIN AFTER SURGERY    We know you are probably wondering what your pain will be like after surgery.  Following surgery it is unrealistic to expect you will not have pain.   Pain is how our bodies let us know that something is wrong or cautions us to be careful.  That said, our  goal is to make your pain tolerable.    Methods we may use to treat your pain include (oral or IV medications, PCAs, epidurals, nerve blocks, etc.)   While some procedures require IV pain medications for a short time after surgery, transitioning to pain medications by mouth allows for better management of pain.   Your nurse will encourage you to take oral pain medications whenever possible.  IV medications work almost immediately, but only last a short while.  Taking medications by mouth allows for a more constant level of medication in your blood stream for a longer period of time.      Once your pain is out of control it is harder to get back under control.  It is important you are aware when your next dose of pain medication is due.  If you are admitted, your nurse may write the time of your next dose on the white board in your room to help you remember.      We are interested in your pain and encourage you to inform us about aggravating factors during your visit.   Many times a simple repositioning every few hours can make a big difference.    If your physician says it is okay, do not let your pain prevent you from getting out of bed. Be sure to call your nurse for assistance prior to getting up so you do not fall.      Before surgery, please decide your tolerable pain goal.  These faces help describe the pain ratings we use on a 0-10 scale.   Be prepared to tell us your goal and whether or not you take pain or anxiety medications at home.          Preparing for Surgery  Preparing for surgery is an important part of your care. It can make things go more smoothly and help you avoid complications. The steps leading up to surgery may vary among hospitals. Follow all instructions given to you by your health care providers. Ask questions if you do not understand something. Talk about any concerns that you have.  Here are some questions to consider asking before your surgery:  If my surgery is not an emergency (is  elective), when would be the best time to have the surgery?  What arrangements do I need to make for work, home, or school?  What will my recovery be like? How long will it be before I can return to normal activities?  Will I need to prepare my home? Will I need to arrange care for me or my children?  Should I expect to have pain after surgery? What are my pain management options? Are there nonmedical options that I can try for pain?  Tell a health care provider about:  Any allergies you have.  All medicines you are taking, including vitamins, herbs, eye drops, creams, and over-the-counter medicines.  Any problems you or family members have had with anesthetic medicines.  Any blood disorders you have.  Any surgeries you have had.  Any medical conditions you have.  Whether you are pregnant or may be pregnant.  What are the risks?  The risks and complications of surgery depend on the specific procedure that you have. Discuss all the risks with your health care providers before your surgery. Ask about common surgical complications, which may include:  Infection.  Bleeding or a need for blood replacement (transfusion).  Allergic reactions to medicines.  Damage to surrounding nerves, tissues, or structures.  A blood clot.  Scarring.  Failure of the surgery to correct the problem.  Follow these instructions before the procedure:  Several days or weeks before your procedure  You may have a physical exam by your primary health care provider to make sure it is safe for you to have surgery.  You may have testing. This may include a chest X-ray, blood and urine tests, electrocardiogram (ECG), or other testing.  Ask your health care provider about:  Changing or stopping your regular medicines. This is especially important if you are taking diabetes medicines or blood thinners.  Taking medicines such as aspirin and ibuprofen. These medicines can thin your blood. Do not take these medicines unless your health care provider tells  you to take them.  Taking over-the-counter medicines, vitamins, herbs, and supplements.  Do not use any products that contain nicotine or tobacco, such as cigarettes and e-cigarettes. If you need help quitting, ask your health care provider.  Avoid alcohol.  Ask your health care provider if there are exercises you can do to prepare for surgery.  Eat a healthy diet.   Plan to have someone take you home from the hospital or clinic.  Plan to have a responsible adult care for you for at least 24 hours after you leave the hospital or clinic. This is important.  The day before your procedure  You may be given antibiotic medicine to take by mouth to help prevent infection. Take it as told by your health care provider.  You may be asked to shower with a germ-killing soap.  Follow instructions from your health care provider about eating and drinking restrictions. This includes gum, mints and hard candy.  Pack comfortable clothes according to your procedure.   The day of your procedure  You may need to take another shower with a germ-killing soap before you leave home in the morning.  With a small sip of water, take only the medicines that you are told to take.  Remove all jewelry including rings.   Leave anything you consider valuable at home except hearing aids if needed.  Do not wear any makeup, nail polish, powder, deodorant, lotion, hair accessories, or anything on your skin or body except your clothes.  If you will be staying in the hospital, bring a case to hold your glasses, contacts, or dentures. You may also want to bring your robe and non-skid footwear.  If you wear oxygen at home, bring it with you the day of surgery.  If instructed by your health care provider, bring your sleep apnea device with you on the day of your surgery (if this applies to you).  You may want to leave your suitcase and sleep apnea device in the car until after surgery.   Arrive at the hospital as scheduled.  Bring a friend or family member  with you who can help to answer questions and be present while you meet with your health care provider.  At the hospital  When you arrive at the hospital:  Go to registration located at the main entrance of the hospital. You will be registered and given a beeper and a sticker sheet. Take the stickers to the Outpatient nurses desk and place in the black tray. This is to notify staff that you have arrived. Then return to the lobby to wait.   When your beeper lights up and vibrates proceed through the double doors, under the stairs, and a member of the Outpatient Surgery staff will escort you to your preoperative room.  You may have to wear compression sleeves. These help to prevent blood clots and reduce swelling in your legs.  An IV may be inserted into one of your veins.              In the operating room, you may be given one or more of the following:        A medicine to help you relax (sedative).        A medicine to numb the area (local anesthetic).        A medicine to make you fall asleep (general anesthetic).        A medicine that is injected into an area of your body to numb everything below the                      injection site (regional anesthetic).  You may be given an antibiotic through your IV to help prevent infection.  Your surgical site will be marked or identified.    Contact a health care provider if you:  Develop a fever of more than 100.4°F (38°C) or other feelings of illness during the 48 hours before your surgery.  Have symptoms that get worse.  Have questions or concerns about your surgery.  Summary  Preparing for surgery can make the procedure go more smoothly and lower your risk of complications.  Before surgery, make a list of questions and concerns to discuss with your surgeon. Ask about the risks and possible complications.  In the days or weeks before your surgery, follow all instructions from your health care provider. You may need to stop smoking, avoid alcohol, follow eating  restrictions, and change or stop your regular medicines.  Contact your surgeon if you develop a fever or other signs of illness during the few days before your surgery.  This information is not intended to replace advice given to you by your health care provider. Make sure you discuss any questions you have with your health care provider.  Document Revised: 12/21/2018 Document Reviewed: 10/23/2018  Elsevier Patient Education © 2021 [employer]

## 2022-11-01 ENCOUNTER — ANESTHESIA (OUTPATIENT)
Dept: PERIOP | Facility: HOSPITAL | Age: 57
End: 2022-11-01

## 2022-11-01 ENCOUNTER — HOSPITAL ENCOUNTER (OUTPATIENT)
Facility: HOSPITAL | Age: 57
Setting detail: HOSPITAL OUTPATIENT SURGERY
Discharge: HOME | End: 2022-11-01
Attending: SURGERY | Admitting: SURGERY

## 2022-11-01 ENCOUNTER — ANESTHESIA EVENT (OUTPATIENT)
Dept: PERIOP | Facility: HOSPITAL | Age: 57
End: 2022-11-01

## 2022-11-01 VITALS
OXYGEN SATURATION: 100 % | RESPIRATION RATE: 18 BRPM | SYSTOLIC BLOOD PRESSURE: 121 MMHG | HEART RATE: 62 BPM | TEMPERATURE: 95.6 F | DIASTOLIC BLOOD PRESSURE: 76 MMHG

## 2022-11-01 DIAGNOSIS — C44.622 SQUAMOUS CELL CARCINOMA OF RIGHT UPPER EXTREMITY: ICD-10-CM

## 2022-11-01 PROCEDURE — 25010000002 CEFAZOLIN PER 500 MG: Performed by: NURSE ANESTHETIST, CERTIFIED REGISTERED

## 2022-11-01 PROCEDURE — 88305 TISSUE EXAM BY PATHOLOGIST: CPT | Performed by: SURGERY

## 2022-11-01 PROCEDURE — 25010000002 ONDANSETRON PER 1 MG: Performed by: NURSE ANESTHETIST, CERTIFIED REGISTERED

## 2022-11-01 PROCEDURE — 25010000002 KETOROLAC TROMETHAMINE PER 15 MG: Performed by: NURSE ANESTHETIST, CERTIFIED REGISTERED

## 2022-11-01 PROCEDURE — 25010000002 DEXAMETHASONE PER 1 MG: Performed by: NURSE ANESTHETIST, CERTIFIED REGISTERED

## 2022-11-01 PROCEDURE — 88331 PATH CONSLTJ SURG 1 BLK 1SPC: CPT | Performed by: PATHOLOGY

## 2022-11-01 PROCEDURE — 25010000002 PROPOFOL 10 MG/ML EMULSION: Performed by: NURSE ANESTHETIST, CERTIFIED REGISTERED

## 2022-11-01 RX ORDER — SODIUM CHLORIDE 0.9 % (FLUSH) 0.9 %
10 SYRINGE (ML) INJECTION EVERY 12 HOURS SCHEDULED
Status: DISCONTINUED | OUTPATIENT
Start: 2022-11-01 | End: 2022-11-01 | Stop reason: HOSPADM

## 2022-11-01 RX ORDER — SODIUM CHLORIDE 0.9 % (FLUSH) 0.9 %
3 SYRINGE (ML) INJECTION AS NEEDED
Status: DISCONTINUED | OUTPATIENT
Start: 2022-11-01 | End: 2022-11-01 | Stop reason: HOSPADM

## 2022-11-01 RX ORDER — PROPOFOL 10 MG/ML
VIAL (ML) INTRAVENOUS AS NEEDED
Status: DISCONTINUED | OUTPATIENT
Start: 2022-11-01 | End: 2022-11-01 | Stop reason: SURG

## 2022-11-01 RX ORDER — LIDOCAINE HYDROCHLORIDE AND EPINEPHRINE 10; 10 MG/ML; UG/ML
INJECTION, SOLUTION INFILTRATION; PERINEURAL AS NEEDED
Status: DISCONTINUED | OUTPATIENT
Start: 2022-11-01 | End: 2022-11-01 | Stop reason: HOSPADM

## 2022-11-01 RX ORDER — LIDOCAINE HYDROCHLORIDE 10 MG/ML
0.5 INJECTION, SOLUTION EPIDURAL; INFILTRATION; INTRACAUDAL; PERINEURAL ONCE AS NEEDED
Status: DISCONTINUED | OUTPATIENT
Start: 2022-11-01 | End: 2022-11-01 | Stop reason: HOSPADM

## 2022-11-01 RX ORDER — DEXAMETHASONE SODIUM PHOSPHATE 4 MG/ML
INJECTION, SOLUTION INTRA-ARTICULAR; INTRALESIONAL; INTRAMUSCULAR; INTRAVENOUS; SOFT TISSUE AS NEEDED
Status: DISCONTINUED | OUTPATIENT
Start: 2022-11-01 | End: 2022-11-01 | Stop reason: SURG

## 2022-11-01 RX ORDER — NALOXONE HCL 0.4 MG/ML
0.4 VIAL (ML) INJECTION AS NEEDED
Status: DISCONTINUED | OUTPATIENT
Start: 2022-11-01 | End: 2022-11-01 | Stop reason: HOSPADM

## 2022-11-01 RX ORDER — ONDANSETRON 2 MG/ML
4 INJECTION INTRAMUSCULAR; INTRAVENOUS ONCE AS NEEDED
Status: DISCONTINUED | OUTPATIENT
Start: 2022-11-01 | End: 2022-11-01 | Stop reason: HOSPADM

## 2022-11-01 RX ORDER — OXYCODONE AND ACETAMINOPHEN 7.5; 325 MG/1; MG/1
2 TABLET ORAL EVERY 4 HOURS PRN
Status: DISCONTINUED | OUTPATIENT
Start: 2022-11-01 | End: 2022-11-01 | Stop reason: HOSPADM

## 2022-11-01 RX ORDER — CEFAZOLIN SODIUM 1 G/3ML
INJECTION, POWDER, FOR SOLUTION INTRAMUSCULAR; INTRAVENOUS AS NEEDED
Status: DISCONTINUED | OUTPATIENT
Start: 2022-11-01 | End: 2022-11-01 | Stop reason: SURG

## 2022-11-01 RX ORDER — SODIUM CHLORIDE, SODIUM LACTATE, POTASSIUM CHLORIDE, CALCIUM CHLORIDE 600; 310; 30; 20 MG/100ML; MG/100ML; MG/100ML; MG/100ML
9 INJECTION, SOLUTION INTRAVENOUS CONTINUOUS
Status: DISCONTINUED | OUTPATIENT
Start: 2022-11-01 | End: 2022-11-01 | Stop reason: HOSPADM

## 2022-11-01 RX ORDER — SODIUM CHLORIDE, SODIUM LACTATE, POTASSIUM CHLORIDE, CALCIUM CHLORIDE 600; 310; 30; 20 MG/100ML; MG/100ML; MG/100ML; MG/100ML
1000 INJECTION, SOLUTION INTRAVENOUS CONTINUOUS
Status: DISCONTINUED | OUTPATIENT
Start: 2022-11-01 | End: 2022-11-01 | Stop reason: HOSPADM

## 2022-11-01 RX ORDER — KETOROLAC TROMETHAMINE 30 MG/ML
INJECTION, SOLUTION INTRAMUSCULAR; INTRAVENOUS AS NEEDED
Status: DISCONTINUED | OUTPATIENT
Start: 2022-11-01 | End: 2022-11-01 | Stop reason: SURG

## 2022-11-01 RX ORDER — MIDAZOLAM HYDROCHLORIDE 1 MG/ML
1 INJECTION INTRAMUSCULAR; INTRAVENOUS
Status: DISCONTINUED | OUTPATIENT
Start: 2022-11-01 | End: 2022-11-01 | Stop reason: HOSPADM

## 2022-11-01 RX ORDER — OXYCODONE AND ACETAMINOPHEN 10; 325 MG/1; MG/1
1 TABLET ORAL ONCE AS NEEDED
Status: DISCONTINUED | OUTPATIENT
Start: 2022-11-01 | End: 2022-11-01 | Stop reason: HOSPADM

## 2022-11-01 RX ORDER — DROPERIDOL 2.5 MG/ML
0.62 INJECTION, SOLUTION INTRAMUSCULAR; INTRAVENOUS ONCE AS NEEDED
Status: DISCONTINUED | OUTPATIENT
Start: 2022-11-01 | End: 2022-11-01 | Stop reason: HOSPADM

## 2022-11-01 RX ORDER — LABETALOL HYDROCHLORIDE 5 MG/ML
5 INJECTION, SOLUTION INTRAVENOUS
Status: DISCONTINUED | OUTPATIENT
Start: 2022-11-01 | End: 2022-11-01 | Stop reason: HOSPADM

## 2022-11-01 RX ORDER — DEXTROSE MONOHYDRATE 25 G/50ML
12.5 INJECTION, SOLUTION INTRAVENOUS AS NEEDED
Status: DISCONTINUED | OUTPATIENT
Start: 2022-11-01 | End: 2022-11-01 | Stop reason: HOSPADM

## 2022-11-01 RX ORDER — SODIUM CHLORIDE 0.9 % (FLUSH) 0.9 %
10 SYRINGE (ML) INJECTION AS NEEDED
Status: DISCONTINUED | OUTPATIENT
Start: 2022-11-01 | End: 2022-11-01 | Stop reason: HOSPADM

## 2022-11-01 RX ORDER — FENTANYL CITRATE 50 UG/ML
25 INJECTION, SOLUTION INTRAMUSCULAR; INTRAVENOUS
Status: DISCONTINUED | OUTPATIENT
Start: 2022-11-01 | End: 2022-11-01 | Stop reason: HOSPADM

## 2022-11-01 RX ORDER — ONDANSETRON 2 MG/ML
INJECTION INTRAMUSCULAR; INTRAVENOUS AS NEEDED
Status: DISCONTINUED | OUTPATIENT
Start: 2022-11-01 | End: 2022-11-01 | Stop reason: SURG

## 2022-11-01 RX ORDER — FLUMAZENIL 0.1 MG/ML
0.2 INJECTION INTRAVENOUS AS NEEDED
Status: DISCONTINUED | OUTPATIENT
Start: 2022-11-01 | End: 2022-11-01 | Stop reason: HOSPADM

## 2022-11-01 RX ORDER — IBUPROFEN 600 MG/1
600 TABLET ORAL ONCE AS NEEDED
Status: DISCONTINUED | OUTPATIENT
Start: 2022-11-01 | End: 2022-11-01 | Stop reason: HOSPADM

## 2022-11-01 RX ORDER — BUPIVACAINE HCL/0.9 % NACL/PF 0.1 %
2 PLASTIC BAG, INJECTION (ML) EPIDURAL ONCE
Status: DISCONTINUED | OUTPATIENT
Start: 2022-11-01 | End: 2022-11-01 | Stop reason: HOSPADM

## 2022-11-01 RX ORDER — MAGNESIUM HYDROXIDE 1200 MG/15ML
LIQUID ORAL AS NEEDED
Status: DISCONTINUED | OUTPATIENT
Start: 2022-11-01 | End: 2022-11-01 | Stop reason: HOSPADM

## 2022-11-01 RX ADMIN — SODIUM CHLORIDE, POTASSIUM CHLORIDE, SODIUM LACTATE AND CALCIUM CHLORIDE 1000 ML: 600; 310; 30; 20 INJECTION, SOLUTION INTRAVENOUS at 06:01

## 2022-11-01 RX ADMIN — PROPOFOL 50 MG: 10 INJECTION, EMULSION INTRAVENOUS at 07:13

## 2022-11-01 RX ADMIN — SODIUM CHLORIDE, POTASSIUM CHLORIDE, SODIUM LACTATE AND CALCIUM CHLORIDE: 600; 310; 30; 20 INJECTION, SOLUTION INTRAVENOUS at 07:09

## 2022-11-01 RX ADMIN — ONDANSETRON 4 MG: 2 INJECTION INTRAMUSCULAR; INTRAVENOUS at 08:12

## 2022-11-01 RX ADMIN — KETOROLAC TROMETHAMINE 30 MG: 30 INJECTION, SOLUTION INTRAMUSCULAR; INTRAVENOUS at 08:13

## 2022-11-01 RX ADMIN — PROPOFOL 30 MG: 10 INJECTION, EMULSION INTRAVENOUS at 08:01

## 2022-11-01 RX ADMIN — CEFAZOLIN SODIUM 2 G: 1 INJECTION, POWDER, FOR SOLUTION INTRAMUSCULAR; INTRAVENOUS at 07:16

## 2022-11-01 RX ADMIN — DEXAMETHASONE SODIUM PHOSPHATE 4 MG: 4 INJECTION, SOLUTION INTRA-ARTICULAR; INTRALESIONAL; INTRAMUSCULAR; INTRAVENOUS; SOFT TISSUE at 07:16

## 2022-11-01 RX ADMIN — PROPOFOL 150 MCG/KG/MIN: 10 INJECTION, EMULSION INTRAVENOUS at 07:13

## 2022-11-01 RX ADMIN — PROPOFOL 30 MG: 10 INJECTION, EMULSION INTRAVENOUS at 07:17

## 2022-11-01 RX ADMIN — PROPOFOL 30 MG: 10 INJECTION, EMULSION INTRAVENOUS at 07:22

## 2022-11-01 RX ADMIN — LIDOCAINE HYDROCHLORIDE 100 MG: 20 INJECTION, SOLUTION INTRAVENOUS at 07:13

## 2022-11-01 NOTE — OP NOTE
UPPER EXTREMITY LESION/CYST EXCISION, SKIN GRAFT FULL THICKNESS  Procedure Report    Patient Name:  Tarah Palomares  YOB: 1965    Date of Surgery:  11/1/2022    Attending Surgeon: Ziyad Linda MD     PROCEDURE:  1.  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  Complex closure, right dorsal hand, 4.5 cm    PREOPERATIVE DIAGNOSIS:  Neoplasm of uncertain behavior, right dorsal hand    POSTOPERATIVE DIAGNOSIS:  Squamous cell carcinoma, right dorsal hand    ANESTHESIA:  MAC with local     INDICATION FOR PROCEDURE:  This is a 57-year-old female with a ulcerative cutaneous lesion of her right dorsal hand concerning for squamous cell carcinoma.  We will proceed with excision with frozen sections followed by reconstruction.    DESCRIPTION OF PROCEDURE:  The patient was taken to the operating room and placed supine on the OR table.  MAC was induced with propofol, and the operative site was injected with 1% lidocaine with epinephrine.  The patient was prepped and draped in sterile fashion.  A timeout was performed with all team members.    Under loupe magnification, I began by sharply excising around the perimeter of the lesion with a narrow margin of adjacent grossly normal dermis.  I carried this excision full-thickness through the dermis with a 6700 Nap[address] blade and then elevated the specimen in the immediate subcutaneous plane.  The specimen was oriented with a suture at the 12:00 margin and sent to pathology for frozen sections.    After receiving confirmation of diagnosis and clear margins, I reexamine the wound.  I felt as though the width was appropriate for primary closure as opposed to skin grafting; however, in order to accomplish appropriate tension on the closure, I elevated the radial and ulnar margins of the defect to increase excursion.  After undermining and elevating the margins, I performed a layered closure with interrupted 4-0 Vicryl and a running intracuticular 4-0  Monocryl.  The incision was dressed with Steri-Strips, sterile gauze, and an Ace wrap.    BLOOD LOSS:   3 mL    COMPLICATIONS:   None    SPECIMENS:          Specimens     ID Source Type Tests Collected By Collected At Frozen?    A Hand, Right Tissue · TISSUE PATHOLOGY EXAM   Ziyad Linda MD 11/1/22 0728 Yes    Description: right dorsal hand squamous cell = stitch at 12 o clock    This specimen was not marked as sent.          IMPLANTS:    Nothing was implanted during the procedure    DISPOSITION:   Home and self-care.  Return to clinic in 2 weeks.    STAFF:  Surgeon(s):  Ziyad Linda MD           Electronically signed by Ziyad Linda MD, 11/01/22, 7:38 AM CDT.

## 2022-11-01 NOTE — ANESTHESIA PREPROCEDURE EVALUATION
Anesthesia Evaluation     Patient summary reviewed   history of anesthetic complications: PONV  NPO Solid Status: > 8 hours             Airway   Mallampati: II  Dental      Pulmonary    (-) COPD, asthma, sleep apnea, not a smoker  Cardiovascular   Exercise tolerance: excellent (>7 METS)    (+) hypertension, hyperlipidemia,   (-) pacemaker, past MI, angina, cardiac stents      Neuro/Psych  (+) CVA,    (-) seizures, TIA  GI/Hepatic/Renal/Endo    (-) GERD, liver disease, no renal disease, diabetes    Musculoskeletal     Abdominal    Substance History      OB/GYN          Other                        Anesthesia Plan    ASA 3     MAC       Anesthetic plan, risks, benefits, and alternatives have been provided, discussed and informed consent has been obtained with: patient.        CODE STATUS:

## 2022-11-01 NOTE — DISCHARGE INSTRUCTIONS
Please keep your hand wrap in place for 72 hours.  Upon removal, you may resume normal handwashing.  Please do not submerge your hand below water for 2 weeks.    You do not require lifting restrictions.  You do not need to keep your incision covered after you remove your wrap.  If you are going to be in a dirty or maria fernanda environment, you can temporarily cover your incision.    Please take over-the-counter pain medication as needed for pain.    Please call my office during or after hours with any questions or concerns

## 2022-11-01 NOTE — ANESTHESIA POSTPROCEDURE EVALUATION
Patient: Tarah Palomares    Procedure Summary     Date: 11/01/22 Room / Location:  [facility] OR  /  [facility]    Anesthesia Start: 0709 Anesthesia Stop: 0828    Procedure: EXCISION OF RIGHT DORSAL HAND SQUAMOUS CELL CARCINOMA (Right) Diagnosis:     Surgeons: Ziyad Linda MD Provider: Denise Lala CRNA    Anesthesia Type: MAC ASA Status: 3          Anesthesia Type: MAC    Vitals  Vitals Value Taken Time   /76 11/01/22 0901   Temp 95.6 °F (35.3 °C) 11/01/22 0828   Pulse 62 11/01/22 0901   Resp 18 11/01/22 0901   SpO2 100 % 11/01/22 0901           Post Anesthesia Care and Evaluation    Patient location during evaluation: PHASE II  Patient participation: complete - patient participated  Level of consciousness: awake and alert  Pain score: 0  Pain management: adequate    Airway patency: patent  Anesthetic complications: No anesthetic complications  PONV Status: none  Cardiovascular status: acceptable  Respiratory status: acceptable  Hydration status: acceptable  No anesthesia care post op

## 2022-11-02 LAB
CYTO UR: NORMAL
LAB AP CASE REPORT: NORMAL
Lab: NORMAL
Lab: NORMAL
PATH REPORT.FINAL DX SPEC: NORMAL
PATH REPORT.GROSS SPEC: NORMAL

## 2023-01-10 ENCOUNTER — PATIENT MESSAGE (OUTPATIENT)
Dept: FAMILY MEDICINE CLINIC | Age: 58
End: 2023-01-10

## 2023-01-10 DIAGNOSIS — Z01.00 ENCOUNTER FOR ROUTINE EYE AND VISION EXAMINATION: Primary | ICD-10-CM

## 2023-01-10 NOTE — TELEPHONE ENCOUNTER
From: Peewee Torres  To: Dr. Ramo Bruce  Sent: 1/10/2023 9:22 AM CST  Subject: Preauthorization for eye exam    Could I get a preauthorization for an upcoming eye exam from [facility] with Dr Lennox Primas. The last two appointments they have coded them as medical.    Thank you.    Agnieszka Troncoso

## 2023-02-20 NOTE — TELEPHONE ENCOUNTER
Patient returning a call back to [facility] call the patient Opioid Counseling: I discussed with the patient the potential side effects of opioids including but not limited to addiction, altered mental status, and depression. I stressed avoiding alcohol, benzodiazepines, muscle relaxants and sleep aids unless specifically okayed by a physician. The patient verbalized understanding of the proper use and possible adverse effects of opioids. All of the patient's questions and concerns were addressed. They were instructed to flush the remaining pills down the toilet if they did not need them for pain.

## 2023-05-15 DIAGNOSIS — I10 ESSENTIAL HYPERTENSION: ICD-10-CM

## 2023-05-15 DIAGNOSIS — E78.5 HYPERLIPIDEMIA, UNSPECIFIED HYPERLIPIDEMIA TYPE: ICD-10-CM

## 2023-05-15 RX ORDER — HYDROCHLOROTHIAZIDE 25 MG/1
TABLET ORAL
Qty: 90 TABLET | Refills: 3 | Status: SHIPPED | OUTPATIENT
Start: 2023-05-15

## 2023-05-15 RX ORDER — ATORVASTATIN CALCIUM 40 MG/1
TABLET, FILM COATED ORAL
Qty: 90 TABLET | Refills: 3 | Status: SHIPPED | OUTPATIENT
Start: 2023-05-15

## 2023-06-19 ENCOUNTER — OFFICE VISIT (OUTPATIENT)
Dept: PRIMARY CARE CLINIC | Age: 58
End: 2023-06-19
Payer: OTHER GOVERNMENT

## 2023-06-19 VITALS
HEART RATE: 88 BPM | OXYGEN SATURATION: 98 % | DIASTOLIC BLOOD PRESSURE: 72 MMHG | TEMPERATURE: 97.7 F | BODY MASS INDEX: 25.77 KG/M2 | WEIGHT: 172 LBS | SYSTOLIC BLOOD PRESSURE: 120 MMHG

## 2023-06-19 DIAGNOSIS — J02.9 SORE THROAT: ICD-10-CM

## 2023-06-19 DIAGNOSIS — J02.0 STREPTOCOCCAL PHARYNGITIS: Primary | ICD-10-CM

## 2023-06-19 LAB — S PYO AG THROAT QL: POSITIVE

## 2023-06-19 PROCEDURE — 3078F DIAST BP <80 MM HG: CPT | Performed by: NURSE PRACTITIONER

## 2023-06-19 PROCEDURE — 99213 OFFICE O/P EST LOW 20 MIN: CPT | Performed by: NURSE PRACTITIONER

## 2023-06-19 PROCEDURE — 3074F SYST BP LT 130 MM HG: CPT | Performed by: NURSE PRACTITIONER

## 2023-06-19 RX ORDER — AMOXICILLIN AND CLAVULANATE POTASSIUM 875; 125 MG/1; MG/1
1 TABLET, FILM COATED ORAL 2 TIMES DAILY
Qty: 20 TABLET | Refills: 0 | Status: SHIPPED | OUTPATIENT
Start: 2023-06-19 | End: 2023-06-29

## 2023-06-19 ASSESSMENT — ENCOUNTER SYMPTOMS
VOMITING: 0
EYE DISCHARGE: 0
NAUSEA: 0
SINUS PRESSURE: 0
WHEEZING: 0
SHORTNESS OF BREATH: 0
EYE ITCHING: 0
BLOOD IN STOOL: 0
ABDOMINAL PAIN: 0
CONSTIPATION: 0
DIARRHEA: 0
RHINORRHEA: 0
SORE THROAT: 1
COLOR CHANGE: 0
COUGH: 0

## 2023-06-19 NOTE — PROGRESS NOTES
[address] [facility] [address] [address]  Dept: [phone]  Dept Fax: [phone]  Loc: [phone]    Jose Carlos Zurita is a 59 y.o. female who presents today for her medical conditions/complaints as noted below. Jose Carlos Zurita is complaining of Pharyngitis and Head Congestion        HPI:   Pharyngitis  This is a new problem. The current episode started in the past 7 days (3 days ago). The problem occurs constantly. The problem has been gradually worsening. Associated symptoms include congestion and a sore throat. Pertinent negatives include no abdominal pain, chest pain, chills, coughing, fatigue, fever, headaches, myalgias, nausea, rash or vomiting. The symptoms are aggravated by swallowing. She has tried nothing for the symptoms.      Past Medical History:   Diagnosis Date    Bronchitis     CAD (coronary artery disease)     Cancer ([address]     uterine     Cryptogenic stroke ([facility] [address] 11/23/2016    Dr. Devlin August    Fracture     right arm    Hyperlipidemia     Hypertension     Status post placement of implantable loop recorder 11/23/2016    Dr Concepcion Gardiner       Past Surgical History:   Procedure Laterality Date    BREAST BIOPSY      BREAST ENHANCEMENT SURGERY  1988    BREAST SURGERY      mass removal-right    COLONOSCOPY  11/26/2013    Dr Tiffanie Otoole: normal 10y recall    COLONOSCOPY N/A 07/16/2021    Dr Chuck Brantley, Int hemorrhoids Grade1, Mild diverticulosis, sub prep fair, 5 year recall    VICKY AND BSO (CERVIX REMOVED)      cervix removed    TUBAL LIGATION      UPPER GASTROINTESTINAL ENDOSCOPY N/A 07/16/2021    Dr Chuck Brantley- gastritis, duodenitis, NEG celiac, NEG h pylori       Family History   Problem Relation Age of Onset    Heart Disease Mother         cardiomyopathy, pacemaker    Hypertension Mother     Cancer Mother         non-hodgkins lymphoma in LL lobe, skin cancer    Other Mother         gb dz, phlebitis    Heart Disease Father     Lung

## 2023-08-03 ENCOUNTER — OFFICE VISIT (OUTPATIENT)
Dept: PRIMARY CARE CLINIC | Age: 58
End: 2023-08-03
Payer: OTHER GOVERNMENT

## 2023-08-03 VITALS
RESPIRATION RATE: 18 BRPM | SYSTOLIC BLOOD PRESSURE: 118 MMHG | DIASTOLIC BLOOD PRESSURE: 70 MMHG | OXYGEN SATURATION: 98 % | TEMPERATURE: 97.4 F | BODY MASS INDEX: 25.77 KG/M2 | WEIGHT: 172 LBS | HEART RATE: 73 BPM

## 2023-08-03 DIAGNOSIS — N89.8 VAGINAL ITCHING: ICD-10-CM

## 2023-08-03 DIAGNOSIS — B37.31 VAGINAL YEAST INFECTION: ICD-10-CM

## 2023-08-03 DIAGNOSIS — N30.00 ACUTE CYSTITIS WITHOUT HEMATURIA: Primary | ICD-10-CM

## 2023-08-03 LAB
APPEARANCE FLUID: ABNORMAL
BILIRUBIN, POC: ABNORMAL
BLOOD URINE, POC: ABNORMAL
CLARITY, POC: ABNORMAL
COLOR, POC: ABNORMAL
GLUCOSE URINE, POC: ABNORMAL
KETONES, POC: ABNORMAL
LEUKOCYTE EST, POC: ABNORMAL
NITRITE, POC: NEGATIVE
PH, POC: 5
PROTEIN, POC: ABNORMAL
SPECIFIC GRAVITY, POC: 1.03
UROBILINOGEN, POC: 0.2

## 2023-08-03 PROCEDURE — 99213 OFFICE O/P EST LOW 20 MIN: CPT | Performed by: NURSE PRACTITIONER

## 2023-08-03 PROCEDURE — 81002 URINALYSIS NONAUTO W/O SCOPE: CPT | Performed by: NURSE PRACTITIONER

## 2023-08-03 PROCEDURE — 3074F SYST BP LT 130 MM HG: CPT | Performed by: NURSE PRACTITIONER

## 2023-08-03 PROCEDURE — 3078F DIAST BP <80 MM HG: CPT | Performed by: NURSE PRACTITIONER

## 2023-08-03 RX ORDER — KETOCONAZOLE 20 MG/G
CREAM TOPICAL
Qty: 30 G | Refills: 0 | Status: SHIPPED | OUTPATIENT
Start: 2023-08-03

## 2023-08-03 RX ORDER — CEFDINIR 300 MG/1
300 CAPSULE ORAL 2 TIMES DAILY
Qty: 14 CAPSULE | Refills: 0 | Status: SHIPPED | OUTPATIENT
Start: 2023-08-03 | End: 2023-08-10

## 2023-08-03 RX ORDER — FLUCONAZOLE 150 MG/1
150 TABLET ORAL DAILY
Qty: 3 TABLET | Refills: 0 | Status: SHIPPED | OUTPATIENT
Start: 2023-08-03 | End: 2023-08-06

## 2023-08-03 ASSESSMENT — ENCOUNTER SYMPTOMS
WHEEZING: 0
SORE THROAT: 0
NAUSEA: 0
CONSTIPATION: 0
BLOOD IN STOOL: 0
ABDOMINAL PAIN: 0
DIARRHEA: 0
VOMITING: 0
COUGH: 0
SINUS PRESSURE: 0
SHORTNESS OF BREATH: 0
COLOR CHANGE: 0
EYE ITCHING: 0
EYE DISCHARGE: 0
RHINORRHEA: 0

## 2023-08-03 NOTE — PATIENT INSTRUCTIONS
- Take full course of antibiotics  - Increase water intake  - Avoid baths or hot tubs  - We will call with urine culture results once received  - Take diflucan as prescribed  - Use ketoconazole as prescribed  - [employer] testing for candidiasis and BV ordered today, will call with results once received. - Take a daily probiotic  - Keep areas as clean and dry as possible  - The patient is to follow up with PCP or return to clinic if symptoms worsen/fail to improve.

## 2023-08-05 LAB — BACTERIA UR CULT: NORMAL

## 2023-11-07 ENCOUNTER — NURSE ONLY (OUTPATIENT)
Dept: FAMILY MEDICINE CLINIC | Age: 58
End: 2023-11-07
Payer: OTHER GOVERNMENT

## 2023-11-07 DIAGNOSIS — Z23 FLU VACCINE NEED: Primary | ICD-10-CM

## 2023-11-07 PROCEDURE — 90674 CCIIV4 VAC NO PRSV 0.5 ML IM: CPT | Performed by: FAMILY MEDICINE

## 2023-11-07 PROCEDURE — 90471 IMMUNIZATION ADMIN: CPT | Performed by: FAMILY MEDICINE

## 2023-11-07 PROCEDURE — 99999 PR OFFICE/OUTPT VISIT,PROCEDURE ONLY: CPT | Performed by: FAMILY MEDICINE

## 2023-11-07 NOTE — PROGRESS NOTES
After obtaining consent, and per orders of Dr. Maxim Santiago, injection of Flu Vaccine given in Left deltoid by Hayley Fermin MA. Patient instructed to remain in clinic for 20 minutes afterwards, and to report any adverse reaction to me immediately.

## 2024-02-19 DIAGNOSIS — Z01.00 EXAMINATION OF EYES AND VISION: Primary | ICD-10-CM

## 2024-03-22 ENCOUNTER — NURSE ONLY (OUTPATIENT)
Dept: FAMILY MEDICINE CLINIC | Age: 59
End: 2024-03-22

## 2024-03-22 DIAGNOSIS — Z13.9 SCREENING DUE: Primary | ICD-10-CM

## 2024-03-22 SDOH — ECONOMIC STABILITY: FOOD INSECURITY: WITHIN THE PAST 12 MONTHS, YOU WORRIED THAT YOUR FOOD WOULD RUN OUT BEFORE YOU GOT MONEY TO BUY MORE.: NEVER TRUE

## 2024-03-22 SDOH — ECONOMIC STABILITY: FOOD INSECURITY: WITHIN THE PAST 12 MONTHS, THE FOOD YOU BOUGHT JUST DIDN'T LAST AND YOU DIDN'T HAVE MONEY TO GET MORE.: NEVER TRUE

## 2024-03-22 SDOH — ECONOMIC STABILITY: HOUSING INSECURITY
IN THE LAST 12 MONTHS, WAS THERE A TIME WHEN YOU DID NOT HAVE A STEADY PLACE TO SLEEP OR SLEPT IN A SHELTER (INCLUDING NOW)?: NO

## 2024-03-22 SDOH — ECONOMIC STABILITY: INCOME INSECURITY: HOW HARD IS IT FOR YOU TO PAY FOR THE VERY BASICS LIKE FOOD, HOUSING, MEDICAL CARE, AND HEATING?: NOT HARD AT ALL

## 2024-03-22 NOTE — PROGRESS NOTES
Rocio Harpreet Social Determinants Of Health (Sdoh) Screening Questionnaire    Question 3/21/2024  8:22 PM CDT - Filed by Patient   How hard is it for you to pay for the very basics like food, housing, medical care, and heating? Not hard at all   Within the past 12 months, you worried that your food would run out before you got the money to buy more. Never true   Within the past 12 months, the food you bought just didn’t last and you didn’t have money to get more. Never true   In the past 12 months, has lack of transportation kept you from meetings, work, or from getting things needed for daily living? No   In the last 12 months, was there a time when you did not have a steady place to sleep or slept in a shelter (including now)? No   Would you like resources for any of these topics? No, thank you

## 2024-03-25 NOTE — PATIENT INSTRUCTIONS
You are going to have an Endoscopy and here are some basic instructions:    Nothing to eat or drink after midnight EXCEPT:  PLEASE TAKE MEDICATION(S) FOR HIGH BLOOD PRESSURE, SEIZURES, HEART, AND THYROID WITH A SIP OF WATER AT LEAST 2 HOURS PRIOR TO ARRIVAL TIME.   YOU MAY ALSO TAKE ANY INHALERS YOU ARE PRESCRIBED. You will not be able to drive for 24 hours after the procedure due to sedation. Bring a  with you the day of the procedure. No aspirin, ibuprofen, naproxen, fish oil or vitamin E for 5 days before procedure. Continue current medications. If you are on blood thinners, clearance from the prescribing physician will be obtained before your procedure is scheduled. Increased [email or web address] may be associated with discontinuation of your blood thinner and include, but not limited to, stroke, TIA, or cardiac event. If biopsies are taken during the procedure they will be sent to a pathologist for analysis. You will be notified by mail of the pathology results in 2-3 weeks. Your physician may also schedule a follow up appointment with the nurse practitioner to discuss pathology, symptoms or to check if you have had any problems related to your procedure. If you prefer not to return to the office after your procedure please discuss this with your physician on the day of your procedure. You are going to have a colonoscopy and here are some instructions: You will be given specific directions regarding restrictions to diet and bowel prep instructions including laxatives. Please read these instructions one week prior to your scheduled procedure to ensure that you are prepared. Follow prep instructions provided for bowel prep. Take all of the bowel prep as directed. If you are having problems with nausea, stop your prep for 30-45 min to allow the nausea to subside before resuming your prep.      Nothing to eat or drink after midnight the day of the procedure EXCEPT:  PLEASE TAKE MEDICATION(S) FOR HIGH BLOOD PRESSURE, THYROID, SEIZURES, AND HEART THE MORNING OF THE PROCEDURE WITH A SIP OF WATER  AT LEAST 2 HOURS PRIOR TO ARRIVAL TIME.   YOU MAY ALSO TAKE ANY INHALERS YOU ARE PRESCRIBED. You will not be able to drive for 24 hours after the procedure due to sedation. Bring a  with you the day of the procedure. No aspirin, ibuprofen, naproxen, fish oil or vitamin E for 5 days before procedure. If you are on blood thinners, clearance from the prescribing physician will be obtained before your procedure is scheduled. [email or web address] may be associated with discontinuation of your blood thinner and include, but not limited to, stroke, TIA, or cardiac event. If polyps are removed during the procedure they will be sent to a pathologist for analysis. You will be notified by mail of the pathology results in 2-3 weeks. Your physician may also schedule a follow up appointment with the nurse practitioner to discuss pathology, symptoms or to check if you have had any problems related to your procedure. If you prefer not to return to the office after your procedure please discuss this with your physician on the day of your colonoscopy. Final recommendations are based on the pathologist report. Your diet before a colonoscopy bowel preparation is very important to ensure a successful colon exam. It is recommended to consider certain changes to your diet three to four days prior to the procedure. Remember that your bowels need to be empty for the exam.    What foods are good to eat? Cut down on heavy solid foods three to four days before the procedure and start introducing lighter meals to your diet. The following food suggestions are a good part of your diet before a colonoscopy bowel preparation.   Light meat that is easily digestible such as chicken (without the skin)   Potatoes without skin   Cheese   Eggs   A light meal of steamed white fish   Light clear Alert-The patient is alert, awake and responds to voice. The patient is oriented to time, place, and person. The triage nurse is able to obtain subjective information.

## 2024-05-07 DIAGNOSIS — I10 ESSENTIAL HYPERTENSION: ICD-10-CM

## 2024-05-07 DIAGNOSIS — E78.5 HYPERLIPIDEMIA, UNSPECIFIED HYPERLIPIDEMIA TYPE: ICD-10-CM

## 2024-05-07 RX ORDER — HYDROCHLOROTHIAZIDE 25 MG/1
TABLET ORAL
Qty: 90 TABLET | Refills: 3 | Status: SHIPPED | OUTPATIENT
Start: 2024-05-07

## 2024-05-07 RX ORDER — ATORVASTATIN CALCIUM 40 MG/1
TABLET, FILM COATED ORAL
Qty: 90 TABLET | Refills: 3 | Status: SHIPPED | OUTPATIENT
Start: 2024-05-07

## 2024-05-14 DIAGNOSIS — I10 ESSENTIAL HYPERTENSION: ICD-10-CM

## 2024-05-14 DIAGNOSIS — E78.5 HYPERLIPIDEMIA, UNSPECIFIED HYPERLIPIDEMIA TYPE: ICD-10-CM

## 2024-05-14 LAB
ALBUMIN SERPL-MCNC: 4.4 G/DL (ref 3.5–5.2)
ALP SERPL-CCNC: 92 U/L (ref 35–104)
ALT SERPL-CCNC: 22 U/L (ref 5–33)
ANION GAP SERPL CALCULATED.3IONS-SCNC: 18 MMOL/L (ref 7–19)
AST SERPL-CCNC: 23 U/L (ref 5–32)
BACTERIA URNS QL MICRO: NEGATIVE /HPF
BILIRUB SERPL-MCNC: 0.8 MG/DL (ref 0.2–1.2)
BILIRUB UR QL STRIP: NEGATIVE
BUN SERPL-MCNC: 12 MG/DL (ref 8–23)
CALCIUM SERPL-MCNC: 9.6 MG/DL (ref 8.8–10.2)
CHLORIDE SERPL-SCNC: 100 MMOL/L (ref 98–111)
CHOLEST SERPL-MCNC: 166 MG/DL (ref 160–199)
CLARITY UR: CLEAR
CO2 SERPL-SCNC: 25 MMOL/L (ref 22–29)
COLOR UR: YELLOW
CREAT SERPL-MCNC: 0.7 MG/DL (ref 0.5–0.9)
CRYSTALS URNS MICRO: NORMAL /HPF
EPI CELLS #/AREA URNS AUTO: 1 /HPF (ref 0–5)
ERYTHROCYTE [DISTWIDTH] IN BLOOD BY AUTOMATED COUNT: 12.5 % (ref 11.5–14.5)
GLUCOSE SERPL-MCNC: 104 MG/DL (ref 74–109)
GLUCOSE UR STRIP.AUTO-MCNC: NEGATIVE MG/DL
HCT VFR BLD AUTO: 40.2 % (ref 37–47)
HDLC SERPL-MCNC: 57 MG/DL (ref 65–121)
HGB BLD-MCNC: 13.5 G/DL (ref 12–16)
HGB UR STRIP.AUTO-MCNC: NEGATIVE MG/L
HYALINE CASTS #/AREA URNS AUTO: 1 /HPF (ref 0–8)
KETONES UR STRIP.AUTO-MCNC: NEGATIVE MG/DL
LDLC SERPL CALC-MCNC: 80 MG/DL
LEUKOCYTE ESTERASE UR QL STRIP.AUTO: ABNORMAL
MCH RBC QN AUTO: 30.1 PG (ref 27–31)
MCHC RBC AUTO-ENTMCNC: 33.6 G/DL (ref 33–37)
MCV RBC AUTO: 89.7 FL (ref 81–99)
NITRITE UR QL STRIP.AUTO: NEGATIVE
PH UR STRIP.AUTO: 7.5 [PH] (ref 5–8)
PLATELET # BLD AUTO: 265 K/UL (ref 130–400)
PMV BLD AUTO: 10.2 FL (ref 9.4–12.3)
POTASSIUM SERPL-SCNC: 4 MMOL/L (ref 3.5–5)
PROT SERPL-MCNC: 7.4 G/DL (ref 6.6–8.7)
PROT UR STRIP.AUTO-MCNC: NEGATIVE MG/DL
RBC # BLD AUTO: 4.48 M/UL (ref 4.2–5.4)
RBC #/AREA URNS AUTO: 1 /HPF (ref 0–4)
SODIUM SERPL-SCNC: 143 MMOL/L (ref 136–145)
SP GR UR STRIP.AUTO: 1.02 (ref 1–1.03)
TRIGL SERPL-MCNC: 143 MG/DL (ref 0–149)
TSH SERPL DL<=0.005 MIU/L-ACNC: 1.81 UIU/ML (ref 0.27–4.2)
UROBILINOGEN UR STRIP.AUTO-MCNC: 0.2 E.U./DL
WBC # BLD AUTO: 6.3 K/UL (ref 4.8–10.8)
WBC #/AREA URNS AUTO: 3 /HPF (ref 0–5)

## 2024-05-16 ASSESSMENT — PATIENT HEALTH QUESTIONNAIRE - PHQ9
2. FEELING DOWN, DEPRESSED OR HOPELESS: NOT AT ALL
SUM OF ALL RESPONSES TO PHQ9 QUESTIONS 1 & 2: 0
SUM OF ALL RESPONSES TO PHQ9 QUESTIONS 1 & 2: 0
1. LITTLE INTEREST OR PLEASURE IN DOING THINGS: NOT AT ALL
2. FEELING DOWN, DEPRESSED OR HOPELESS: NOT AT ALL
SUM OF ALL RESPONSES TO PHQ QUESTIONS 1-9: 0
1. LITTLE INTEREST OR PLEASURE IN DOING THINGS: NOT AT ALL
SUM OF ALL RESPONSES TO PHQ QUESTIONS 1-9: 0

## 2024-05-23 ENCOUNTER — OFFICE VISIT (OUTPATIENT)
Dept: FAMILY MEDICINE CLINIC | Age: 59
End: 2024-05-23
Payer: OTHER GOVERNMENT

## 2024-05-23 VITALS
DIASTOLIC BLOOD PRESSURE: 68 MMHG | OXYGEN SATURATION: 98 % | WEIGHT: 172 LBS | HEART RATE: 70 BPM | HEIGHT: 69 IN | SYSTOLIC BLOOD PRESSURE: 116 MMHG | BODY MASS INDEX: 25.48 KG/M2 | TEMPERATURE: 97 F

## 2024-05-23 DIAGNOSIS — G89.29 CHRONIC PAIN OF RIGHT KNEE: ICD-10-CM

## 2024-05-23 DIAGNOSIS — M25.561 CHRONIC PAIN OF RIGHT KNEE: ICD-10-CM

## 2024-05-23 DIAGNOSIS — M77.11 LATERAL EPICONDYLITIS OF RIGHT ELBOW: ICD-10-CM

## 2024-05-23 DIAGNOSIS — E78.5 HYPERLIPIDEMIA, UNSPECIFIED HYPERLIPIDEMIA TYPE: ICD-10-CM

## 2024-05-23 DIAGNOSIS — I10 ESSENTIAL HYPERTENSION: ICD-10-CM

## 2024-05-23 DIAGNOSIS — Z00.00 WELLNESS EXAMINATION: Primary | ICD-10-CM

## 2024-05-23 PROCEDURE — 99396 PREV VISIT EST AGE 40-64: CPT | Performed by: FAMILY MEDICINE

## 2024-05-23 PROCEDURE — 3078F DIAST BP <80 MM HG: CPT | Performed by: FAMILY MEDICINE

## 2024-05-23 PROCEDURE — 3074F SYST BP LT 130 MM HG: CPT | Performed by: FAMILY MEDICINE

## 2024-05-23 SDOH — ECONOMIC STABILITY: FOOD INSECURITY: WITHIN THE PAST 12 MONTHS, THE FOOD YOU BOUGHT JUST DIDN'T LAST AND YOU DIDN'T HAVE MONEY TO GET MORE.: NEVER TRUE

## 2024-05-23 SDOH — ECONOMIC STABILITY: FOOD INSECURITY: WITHIN THE PAST 12 MONTHS, YOU WORRIED THAT YOUR FOOD WOULD RUN OUT BEFORE YOU GOT MONEY TO BUY MORE.: NEVER TRUE

## 2024-05-23 SDOH — ECONOMIC STABILITY: INCOME INSECURITY: HOW HARD IS IT FOR YOU TO PAY FOR THE VERY BASICS LIKE FOOD, HOUSING, MEDICAL CARE, AND HEATING?: NOT HARD AT ALL

## 2024-05-23 NOTE — PROGRESS NOTES
SUBJECTIVE:    Patient ID: Emerald Ortega is a 58 y.o.female.    HPI:   Patient here for wellness check.  Patient is a 58-year-old female.  She is here for a wellness check.  She have hypertension and hyperlipidemia.  Take blood pressure medication and cholesterol medication.  She is compliant with therapy.  Denies medication side effects.  She has been doing well.  Blood work reviewed with patient today.  She have chronic right knee pain use Voltaren gel.  Need a refill of the medication.  Medication is helpful.  She does complain of pain in the right elbow that radiated to the wrist.  Seems to be worse with movement.  Interfering with her golfing.  Pain is in the lateral epicondyle.  Depression screen negative.  No alcohol abuse.  Illegal drug use.  No smoking.  She is slightly overweight.  Discussed increasing exercise.         Past Medical History:   Diagnosis Date    Bronchitis     CAD (coronary artery disease)     Cancer (HCC)     uterine     Cryptogenic stroke (HCC) 11/23/2016    Dr. Sinclair    Fracture     right arm    Hyperlipidemia     Hypertension     Status post placement of implantable loop recorder 11/23/2016    Dr Downs      Current Outpatient Medications   Medication Sig Dispense Refill    diclofenac sodium (VOLTAREN) 1 % GEL Apply 4 g topically 2 times daily 150 g 5    hydroCHLOROthiazide (HYDRODIURIL) 25 MG tablet TAKE 1 TABLET DAILY 90 tablet 3    atorvastatin (LIPITOR) 40 MG tablet TAKE 1 TABLET DAILY FOR HIGH CHOLESTEROL 90 tablet 3    ketoconazole (NIZORAL) 2 % cream Apply topically daily. 30 g 0    Ferrous Sulfate (IRON PO) Take by mouth daily      ZINC PO Take by mouth daily       Ascorbic Acid (VITAMIN C) 250 MG tablet Take 1 tablet by mouth daily      vitamin D (CHOLECALCIFEROL) 1000 UNIT TABS tablet Take 1 tablet by mouth daily      aspirin 81 MG EC tablet Take 1 tablet by mouth daily 30 tablet 3    Omega-3 Fatty Acids (FISH OIL) 1200 MG CAPS Take by mouth daily      Clobetasol Prop

## 2024-07-17 ENCOUNTER — OFFICE VISIT (OUTPATIENT)
Dept: PRIMARY CARE CLINIC | Age: 59
End: 2024-07-17

## 2024-07-17 VITALS
OXYGEN SATURATION: 99 % | SYSTOLIC BLOOD PRESSURE: 116 MMHG | HEART RATE: 80 BPM | BODY MASS INDEX: 25.47 KG/M2 | DIASTOLIC BLOOD PRESSURE: 70 MMHG | WEIGHT: 170 LBS | TEMPERATURE: 98.6 F | RESPIRATION RATE: 16 BRPM

## 2024-07-17 DIAGNOSIS — S61.411A SKIN TEAR OF HAND WITHOUT COMPLICATION, RIGHT, INITIAL ENCOUNTER: Primary | ICD-10-CM

## 2024-07-17 ASSESSMENT — ENCOUNTER SYMPTOMS
SHORTNESS OF BREATH: 0
COUGH: 0
COLOR CHANGE: 0
EYE DISCHARGE: 0
DIARRHEA: 0
BLOOD IN STOOL: 0
NAUSEA: 0
CONSTIPATION: 0
SORE THROAT: 0
ABDOMINAL PAIN: 0
RHINORRHEA: 0
EYE ITCHING: 0
VOMITING: 0
SINUS PRESSURE: 0
WHEEZING: 0

## 2024-07-17 NOTE — PATIENT INSTRUCTIONS
- Keep the wound dry for the first 24 to 48 hours. After this, you may shower and get the affected area wet. Pat the wound dry.  - Do not put any kind of ointment, cream, or lotion over the area. This can make the adhesive fall off too soon.  - Leave the skin adhesive on until it falls off on its own. This may take 5 to 10 days. Do not pick it off.   - May take tylenol/motrin as needed for pain  - Follow-up with primary care provider if symptoms begin to worsen.     Signs of infection to monitor for:   - fever  - unusual swelling, redness, or drainage from site     If signs of infection occur, please follow up with PCP or our clinic

## 2024-07-17 NOTE — PROGRESS NOTES
Musculoskeletal:        Hands:    Skin:     General: Skin is warm.      Capillary Refill: Capillary refill takes less than 2 seconds.      Coloration: Skin is not pale.      Findings: Laceration (skin tear right hand) present. No rash.   Neurological:      General: No focal deficit present.      Mental Status: She is alert and oriented to person, place, and time.      Deep Tendon Reflexes: Reflexes are normal and symmetric.   Psychiatric:         Attention and Perception: Attention normal.         Mood and Affect: Mood normal.         Behavior: Behavior normal. Behavior is cooperative.         Thought Content: Thought content normal.         /70   Pulse 80   Temp 98.6 °F (37 °C) (Temporal)   Resp 16   Wt 77.1 kg (170 lb)   LMP 08/24/2017   SpO2 99%   BMI 25.47 kg/m²     Assessment   Assessment & Plan      Diagnosis Orders   1. Skin tear of hand without complication, right, initial encounter            Plan   Skin Adhesive Instructions:  - Keep the wound dry for the first 24 to 48 hours. After this, you may shower and get the affected area wet. Pat the wound dry.  - Do not put any kind of ointment, cream, or lotion over the area. This can make the adhesive fall off too soon.  - Leave the skin adhesive on until it falls off on its own. This may take 5 to 10 days. Do not pick it off.   - May take tylenol/motrin as needed for pain  - Follow-up with primary care provider if symptoms begin to worsen.     Signs of infection to monitor for:   - fever  - unusual swelling, redness, or drainage from site     If signs of infection occur, please follow up with PCP or our clinic.    Patient was given verbal informed consent and agreed verbally and with signed consent to the risks and benefits of procedure.   The wound was cleansed/flushed with sterile saline and hibiclens; no foreign bodies or debris noted. Dermabond was then applied with edges of skin tear well approximated.  Pt tolerated procedure well. Sterile

## 2024-07-31 ENCOUNTER — PATIENT MESSAGE (OUTPATIENT)
Dept: FAMILY MEDICINE CLINIC | Age: 59
End: 2024-07-31

## 2024-07-31 DIAGNOSIS — Z12.31 ENCOUNTER FOR SCREENING MAMMOGRAM FOR MALIGNANT NEOPLASM OF BREAST: Primary | ICD-10-CM

## 2024-08-02 NOTE — TELEPHONE ENCOUNTER
From: Emerald Ortega  To: Dr. Alex Hawkins  Sent: 2024 10:02 AM CDT  Subject: Labs    I need my annual labs faxed to [facility], [phone] Attn: Kareem. Please include my name and .  I also need approval for a mammogram, [facility]. Thank.    Emerald

## 2024-08-14 ENCOUNTER — HOSPITAL ENCOUNTER (OUTPATIENT)
Dept: WOMENS IMAGING | Age: 59
Discharge: HOME | End: 2024-08-14
Payer: OTHER GOVERNMENT

## 2024-08-14 DIAGNOSIS — Z12.31 ENCOUNTER FOR SCREENING MAMMOGRAM FOR MALIGNANT NEOPLASM OF BREAST: ICD-10-CM

## 2024-08-14 PROCEDURE — 77063 BREAST TOMOSYNTHESIS BI: CPT

## 2024-09-09 ENCOUNTER — OFFICE VISIT (OUTPATIENT)
Dept: FAMILY MEDICINE CLINIC | Age: 59
End: 2024-09-09
Payer: OTHER GOVERNMENT

## 2024-09-09 VITALS
SYSTOLIC BLOOD PRESSURE: 124 MMHG | TEMPERATURE: 96.9 F | HEART RATE: 79 BPM | HEIGHT: 69 IN | WEIGHT: 169 LBS | BODY MASS INDEX: 25.03 KG/M2 | OXYGEN SATURATION: 100 % | DIASTOLIC BLOOD PRESSURE: 70 MMHG

## 2024-09-09 DIAGNOSIS — N89.8 VAGINAL DISCHARGE: ICD-10-CM

## 2024-09-09 DIAGNOSIS — N89.8 VAGINAL ITCHING: Primary | ICD-10-CM

## 2024-09-09 DIAGNOSIS — Z90.710 HISTORY OF HYSTERECTOMY FOR CANCER: ICD-10-CM

## 2024-09-09 PROCEDURE — 3078F DIAST BP <80 MM HG: CPT | Performed by: NURSE PRACTITIONER

## 2024-09-09 PROCEDURE — 99214 OFFICE O/P EST MOD 30 MIN: CPT | Performed by: NURSE PRACTITIONER

## 2024-09-09 PROCEDURE — 3074F SYST BP LT 130 MM HG: CPT | Performed by: NURSE PRACTITIONER

## 2024-09-09 RX ORDER — TERCONAZOLE 0.4 %
CREAM WITH APPLICATOR VAGINAL
Qty: 45 G | Refills: 0 | Status: SHIPPED | OUTPATIENT
Start: 2024-09-09 | End: 2024-09-16

## 2024-09-09 RX ORDER — FLUCONAZOLE 150 MG/1
TABLET ORAL
Qty: 5 TABLET | Refills: 0 | Status: SHIPPED | OUTPATIENT
Start: 2024-09-09

## 2024-09-09 SDOH — ECONOMIC STABILITY: FOOD INSECURITY: WITHIN THE PAST 12 MONTHS, THE FOOD YOU BOUGHT JUST DIDN'T LAST AND YOU DIDN'T HAVE MONEY TO GET MORE.: NEVER TRUE

## 2024-09-09 SDOH — ECONOMIC STABILITY: INCOME INSECURITY: HOW HARD IS IT FOR YOU TO PAY FOR THE VERY BASICS LIKE FOOD, HOUSING, MEDICAL CARE, AND HEATING?: NOT HARD AT ALL

## 2024-09-09 SDOH — ECONOMIC STABILITY: FOOD INSECURITY: WITHIN THE PAST 12 MONTHS, YOU WORRIED THAT YOUR FOOD WOULD RUN OUT BEFORE YOU GOT MONEY TO BUY MORE.: NEVER TRUE

## 2024-09-09 ASSESSMENT — PATIENT HEALTH QUESTIONNAIRE - PHQ9
SUM OF ALL RESPONSES TO PHQ QUESTIONS 1-9: 0
SUM OF ALL RESPONSES TO PHQ QUESTIONS 1-9: 0
1. LITTLE INTEREST OR PLEASURE IN DOING THINGS: NOT AT ALL
2. FEELING DOWN, DEPRESSED OR HOPELESS: NOT AT ALL
SUM OF ALL RESPONSES TO PHQ9 QUESTIONS 1 & 2: 0
SUM OF ALL RESPONSES TO PHQ QUESTIONS 1-9: 0
SUM OF ALL RESPONSES TO PHQ QUESTIONS 1-9: 0

## 2024-09-09 ASSESSMENT — ENCOUNTER SYMPTOMS
EYES NEGATIVE: 1
ALLERGIC/IMMUNOLOGIC NEGATIVE: 1
GASTROINTESTINAL NEGATIVE: 1
RESPIRATORY NEGATIVE: 1

## 2024-09-11 ENCOUNTER — HOSPITAL ENCOUNTER (OUTPATIENT)
Dept: GENERAL RADIOLOGY | Age: 59
Discharge: HOME | End: 2024-09-11
Payer: OTHER GOVERNMENT

## 2024-09-11 DIAGNOSIS — N89.8 VAGINAL DISCHARGE: ICD-10-CM

## 2024-09-11 DIAGNOSIS — Z90.710 HISTORY OF HYSTERECTOMY FOR CANCER: ICD-10-CM

## 2024-09-11 PROCEDURE — 76830 TRANSVAGINAL US NON-OB: CPT

## 2024-09-16 DIAGNOSIS — N89.8 VAGINAL DISCHARGE: Primary | ICD-10-CM

## 2024-09-16 DIAGNOSIS — N89.8 VAGINAL ITCHING: ICD-10-CM

## 2024-09-16 DIAGNOSIS — Z90.710 HISTORY OF HYSTERECTOMY FOR CANCER: ICD-10-CM

## 2025-01-22 DIAGNOSIS — M25.561 CHRONIC PAIN OF RIGHT KNEE: ICD-10-CM

## 2025-01-22 DIAGNOSIS — G89.29 CHRONIC PAIN OF RIGHT KNEE: ICD-10-CM

## 2025-05-02 DIAGNOSIS — I10 ESSENTIAL HYPERTENSION: ICD-10-CM

## 2025-05-02 DIAGNOSIS — E78.5 HYPERLIPIDEMIA, UNSPECIFIED HYPERLIPIDEMIA TYPE: ICD-10-CM

## 2025-05-02 RX ORDER — ATORVASTATIN CALCIUM 40 MG/1
TABLET, FILM COATED ORAL
Qty: 90 TABLET | Refills: 3 | Status: SHIPPED | OUTPATIENT
Start: 2025-05-02

## 2025-05-02 RX ORDER — HYDROCHLOROTHIAZIDE 25 MG/1
25 TABLET ORAL DAILY
Qty: 90 TABLET | Refills: 3 | Status: SHIPPED | OUTPATIENT
Start: 2025-05-02

## 2025-05-15 ENCOUNTER — RESULTS FOLLOW-UP (OUTPATIENT)
Age: 60
End: 2025-05-15

## 2025-05-15 DIAGNOSIS — I10 ESSENTIAL HYPERTENSION: ICD-10-CM

## 2025-05-15 DIAGNOSIS — E78.5 HYPERLIPIDEMIA, UNSPECIFIED HYPERLIPIDEMIA TYPE: ICD-10-CM

## 2025-05-15 LAB
ALBUMIN SERPL-MCNC: 4.1 G/DL (ref 3.5–5.2)
ALP SERPL-CCNC: 89 U/L (ref 35–104)
ALT SERPL-CCNC: 21 U/L (ref 10–35)
ANION GAP SERPL CALCULATED.3IONS-SCNC: 12 MMOL/L (ref 8–16)
AST SERPL-CCNC: 25 U/L (ref 10–35)
BACTERIA URNS QL MICRO: NEGATIVE /HPF
BILIRUB SERPL-MCNC: 0.5 MG/DL (ref 0.2–1.2)
BILIRUB UR QL STRIP: NEGATIVE
BUN SERPL-MCNC: 14 MG/DL (ref 8–23)
CALCIUM SERPL-MCNC: 9.5 MG/DL (ref 8.8–10.2)
CHLORIDE SERPL-SCNC: 101 MMOL/L (ref 98–107)
CHOLEST SERPL-MCNC: 183 MG/DL (ref 0–199)
CLARITY UR: CLEAR
CO2 SERPL-SCNC: 27 MMOL/L (ref 22–29)
COLOR UR: YELLOW
CREAT SERPL-MCNC: 0.8 MG/DL (ref 0.5–0.9)
CRYSTALS URNS MICRO: NORMAL /HPF
EPI CELLS #/AREA URNS AUTO: 2 /HPF (ref 0–5)
ERYTHROCYTE [DISTWIDTH] IN BLOOD BY AUTOMATED COUNT: 12.3 % (ref 11.5–14.5)
GLUCOSE SERPL-MCNC: 106 MG/DL (ref 70–99)
GLUCOSE UR STRIP.AUTO-MCNC: NEGATIVE MG/DL
HBA1C MFR BLD: 5.7 % (ref 4–5.6)
HCT VFR BLD AUTO: 41.6 % (ref 37–47)
HDLC SERPL-MCNC: 52 MG/DL (ref 40–60)
HGB BLD-MCNC: 14.2 G/DL (ref 12–16)
HGB UR STRIP.AUTO-MCNC: NEGATIVE MG/L
HYALINE CASTS #/AREA URNS AUTO: 2 /HPF (ref 0–8)
KETONES UR STRIP.AUTO-MCNC: NEGATIVE MG/DL
LDLC SERPL CALC-MCNC: 100 MG/DL
LEUKOCYTE ESTERASE UR QL STRIP.AUTO: ABNORMAL
MCH RBC QN AUTO: 30.5 PG (ref 27–31)
MCHC RBC AUTO-ENTMCNC: 34.1 G/DL (ref 33–37)
MCV RBC AUTO: 89.3 FL (ref 81–99)
NITRITE UR QL STRIP.AUTO: NEGATIVE
PH UR STRIP.AUTO: 7.5 [PH] (ref 5–8)
PLATELET # BLD AUTO: 251 K/UL (ref 130–400)
PMV BLD AUTO: 9.8 FL (ref 9.4–12.3)
POTASSIUM SERPL-SCNC: 4.4 MMOL/L (ref 3.5–5.1)
PROT SERPL-MCNC: 7.2 G/DL (ref 6.4–8.3)
PROT UR STRIP.AUTO-MCNC: NEGATIVE MG/DL
RBC # BLD AUTO: 4.66 M/UL (ref 4.2–5.4)
RBC #/AREA URNS AUTO: 1 /HPF (ref 0–4)
SODIUM SERPL-SCNC: 140 MMOL/L (ref 136–145)
SP GR UR STRIP.AUTO: 1.02 (ref 1–1.03)
TRIGL SERPL-MCNC: 156 MG/DL (ref 0–149)
TSH SERPL DL<=0.005 MIU/L-ACNC: 2.1 UIU/ML (ref 0.27–4.2)
UROBILINOGEN UR STRIP.AUTO-MCNC: 0.2 E.U./DL
WBC # BLD AUTO: 6.1 K/UL (ref 4.8–10.8)
WBC #/AREA URNS AUTO: 2 /HPF (ref 0–5)

## 2025-05-20 ENCOUNTER — PATIENT MESSAGE (OUTPATIENT)
Age: 60
End: 2025-05-20

## 2025-05-20 DIAGNOSIS — M25.531 BILATERAL WRIST PAIN: Primary | ICD-10-CM

## 2025-05-20 DIAGNOSIS — M25.532 BILATERAL WRIST PAIN: Primary | ICD-10-CM

## 2025-06-05 ENCOUNTER — OFFICE VISIT (OUTPATIENT)
Age: 60
End: 2025-06-05

## 2025-06-05 VITALS — BODY MASS INDEX: 26.51 KG/M2 | HEIGHT: 69 IN | WEIGHT: 179 LBS

## 2025-06-05 DIAGNOSIS — M25.531 BILATERAL WRIST PAIN: Primary | ICD-10-CM

## 2025-06-05 DIAGNOSIS — M19.031 PRIMARY OSTEOARTHRITIS OF RIGHT WRIST: ICD-10-CM

## 2025-06-05 DIAGNOSIS — M25.532 BILATERAL WRIST PAIN: Primary | ICD-10-CM

## 2025-06-05 DIAGNOSIS — M19.032 PRIMARY OSTEOARTHRITIS OF LEFT WRIST: ICD-10-CM

## 2025-06-05 DIAGNOSIS — R20.2 PARESTHESIAS IN RIGHT HAND: ICD-10-CM

## 2025-06-05 RX ORDER — TRIAMCINOLONE ACETONIDE 40 MG/ML
40 INJECTION, SUSPENSION INTRA-ARTICULAR; INTRAMUSCULAR ONCE
Status: COMPLETED | OUTPATIENT
Start: 2025-06-05 | End: 2025-06-05

## 2025-06-05 RX ORDER — LIDOCAINE HYDROCHLORIDE 10 MG/ML
2 INJECTION, SOLUTION INFILTRATION; PERINEURAL ONCE
Status: COMPLETED | OUTPATIENT
Start: 2025-06-05 | End: 2025-06-05

## 2025-06-05 RX ORDER — DICLOFENAC SODIUM 75 MG/1
75 TABLET, DELAYED RELEASE ORAL 2 TIMES DAILY
Qty: 60 TABLET | Refills: 0 | Status: SHIPPED | OUTPATIENT
Start: 2025-06-05

## 2025-06-05 RX ADMIN — LIDOCAINE HYDROCHLORIDE 2 ML: 10 INJECTION, SOLUTION INFILTRATION; PERINEURAL at 11:24

## 2025-06-05 RX ADMIN — TRIAMCINOLONE ACETONIDE 40 MG: 40 INJECTION, SUSPENSION INTRA-ARTICULAR; INTRAMUSCULAR at 11:24

## 2025-06-05 NOTE — PROGRESS NOTES
Orthopaedic History and Physical - New Patient    NAME:  Emerald Ortega   : 1965  MRN: [id number]      2025     CHIEF COMPLAINT:  bilateral wrist pain, right worse than left    HISTORY OF PRESENT ILLNESS:   The patient is a 60 y.o. right-hand-dominant female who presents to the office for evaluation and treatment of nontraumatic bilateral wrist pain, right worse than left.  Pain is described as achy, throbbing, associated with intermittent swelling, weakness, stiffness worse at night and first thing in the morning.  She also endorses pain, numbness, tingling, burning in the right little finger. Treatment has consisted of topicals and bracing.  Pain is rated a 5/10.    Past Medical History:        Diagnosis Date    Bronchitis     CAD (coronary artery disease)     Cancer (HCC)     uterine     Cryptogenic stroke (HCC) 2016    Dr. Sinclair    Fracture     right arm    Hyperlipidemia     Hypertension     Status post placement of implantable loop recorder 2016    Dr Downs       Past Surgical History:        Procedure Laterality Date    BREAST BIOPSY      BREAST ENHANCEMENT SURGERY      BREAST SURGERY      mass removal-right    COLONOSCOPY  2013    Dr Chan: normal 10y recall    COLONOSCOPY N/A 2021    Dr Bee, Int hemorrhoids Grade1, Mild diverticulosis, sub prep fair, 5 year recall    SKIN CANCER EXCISION Right     hand    VICKY AND BSO (CERVIX REMOVED)      cervix removed    TUBAL LIGATION      UPPER GASTROINTESTINAL ENDOSCOPY N/A 2021    Dr Bee- gastritis, duodenitis, NEG celiac, NEG h pylori       Current Medications:   Prior to Admission medications    Medication Sig Start Date End Date Taking? Authorizing Provider   BIOTIN PO 1 tab po qd   Yes Provider, MD Mikael   diclofenac (VOLTAREN) 75 MG EC tablet Take 1 tablet by mouth 2 times daily 25  Yes Soraida Lange PA-C   atorvastatin (LIPITOR) 40 MG tablet TAKE 1 TABLET DAILY FOR HIGH

## 2025-06-09 ASSESSMENT — PATIENT HEALTH QUESTIONNAIRE - PHQ9
SUM OF ALL RESPONSES TO PHQ QUESTIONS 1-9: 0
SUM OF ALL RESPONSES TO PHQ9 QUESTIONS 1 & 2: 0
SUM OF ALL RESPONSES TO PHQ QUESTIONS 1-9: 0
1. LITTLE INTEREST OR PLEASURE IN DOING THINGS: NOT AT ALL
2. FEELING DOWN, DEPRESSED OR HOPELESS: NOT AT ALL
SUM OF ALL RESPONSES TO PHQ QUESTIONS 1-9: 0
2. FEELING DOWN, DEPRESSED OR HOPELESS: NOT AT ALL
SUM OF ALL RESPONSES TO PHQ QUESTIONS 1-9: 0
1. LITTLE INTEREST OR PLEASURE IN DOING THINGS: NOT AT ALL

## 2025-06-12 ENCOUNTER — OFFICE VISIT (OUTPATIENT)
Age: 60
End: 2025-06-12
Payer: OTHER GOVERNMENT

## 2025-06-12 VITALS
DIASTOLIC BLOOD PRESSURE: 70 MMHG | HEART RATE: 57 BPM | HEIGHT: 69 IN | TEMPERATURE: 97.4 F | SYSTOLIC BLOOD PRESSURE: 118 MMHG | BODY MASS INDEX: 26.22 KG/M2 | WEIGHT: 177 LBS | OXYGEN SATURATION: 98 %

## 2025-06-12 DIAGNOSIS — I10 ESSENTIAL HYPERTENSION: ICD-10-CM

## 2025-06-12 DIAGNOSIS — E78.5 HYPERLIPIDEMIA, UNSPECIFIED HYPERLIPIDEMIA TYPE: ICD-10-CM

## 2025-06-12 DIAGNOSIS — Z00.00 WELLNESS EXAMINATION: Primary | ICD-10-CM

## 2025-06-12 DIAGNOSIS — R10.11 RUQ PAIN: ICD-10-CM

## 2025-06-12 DIAGNOSIS — M20.42 HAMMER TOE OF SECOND TOE OF LEFT FOOT: ICD-10-CM

## 2025-06-12 PROCEDURE — 3074F SYST BP LT 130 MM HG: CPT | Performed by: FAMILY MEDICINE

## 2025-06-12 PROCEDURE — 99396 PREV VISIT EST AGE 40-64: CPT | Performed by: FAMILY MEDICINE

## 2025-06-12 PROCEDURE — 3078F DIAST BP <80 MM HG: CPT | Performed by: FAMILY MEDICINE

## 2025-06-12 SDOH — ECONOMIC STABILITY: FOOD INSECURITY: WITHIN THE PAST 12 MONTHS, YOU WORRIED THAT YOUR FOOD WOULD RUN OUT BEFORE YOU GOT MONEY TO BUY MORE.: NEVER TRUE

## 2025-06-12 SDOH — ECONOMIC STABILITY: FOOD INSECURITY: WITHIN THE PAST 12 MONTHS, THE FOOD YOU BOUGHT JUST DIDN'T LAST AND YOU DIDN'T HAVE MONEY TO GET MORE.: NEVER TRUE

## 2025-06-12 ASSESSMENT — ENCOUNTER SYMPTOMS
EYES NEGATIVE: 1
RESPIRATORY NEGATIVE: 1
ALLERGIC/IMMUNOLOGIC NEGATIVE: 1
GASTROINTESTINAL NEGATIVE: 1

## 2025-06-12 NOTE — PROGRESS NOTES
SUBJECTIVE:    Patient ID: Emerald Ortega is a 60 y.o.female.    HPI:   Patient here for follow-up of multiple medical problems  Patient is 60-year-old female.  She is here for a wellness check.  She have hypertension hyperlipidemia.  Takes blood pressure medication and cholesterol medication.  Blood pressure is well-controlled.  Cholesterol seems to be stable and she is taking 40 mg of Lipitor.  Depression screen negative.  Only 2 new problems she complained of hammertoe on the left foot that is giving her problems she would like to see a podiatrist for this.  She also complaining of epigastric right upper quadrant pain associated with nausea and vomiting.  She is concerned may be her gallbladder.  Denies any bloody stools or black stools.  Denies any hematemesis.         Past Medical History:   Diagnosis Date    Bronchitis     CAD (coronary artery disease)     Cancer (HCC)     uterine     Cryptogenic stroke (HCC) 11/23/2016    Dr. Sinclair    Fracture     right arm    Hyperlipidemia     Hypertension     Status post placement of implantable loop recorder 11/23/2016    Dr Downs      Current Outpatient Medications   Medication Sig Dispense Refill    BIOTIN PO 1 tab po qd      diclofenac (VOLTAREN) 75 MG EC tablet Take 1 tablet by mouth 2 times daily 60 tablet 0    atorvastatin (LIPITOR) 40 MG tablet TAKE 1 TABLET DAILY FOR HIGH CHOLESTEROL 90 tablet 3    hydroCHLOROthiazide (HYDRODIURIL) 25 MG tablet TAKE 1 TABLET DAILY 90 tablet 3    diclofenac sodium (VOLTAREN) 1 % GEL Apply 4 g topically 2 times daily 100 g 5    ketoconazole (NIZORAL) 2 % cream Apply topically daily. 30 g 0    Ferrous Sulfate (IRON PO) Take by mouth daily      vitamin D (CHOLECALCIFEROL) 1000 UNIT TABS tablet Take 1 tablet by mouth daily      aspirin 81 MG EC tablet Take 1 tablet by mouth daily 30 tablet 3    Omega-3 Fatty Acids (FISH OIL) 1200 MG CAPS Take by mouth daily      Clobetasol Prop Oint-Coal Tar (CLOBETAPLUS OINTMENT EX) Apply 1

## 2025-06-16 ENCOUNTER — HOSPITAL ENCOUNTER (OUTPATIENT)
Dept: GENERAL RADIOLOGY | Age: 60
Discharge: HOME | End: 2025-06-16
Payer: OTHER GOVERNMENT

## 2025-06-16 ENCOUNTER — RESULTS FOLLOW-UP (OUTPATIENT)
Age: 60
End: 2025-06-16

## 2025-06-16 ENCOUNTER — CLINICAL SUPPORT (OUTPATIENT)
Age: 60
End: 2025-06-16
Payer: OTHER GOVERNMENT

## 2025-06-16 DIAGNOSIS — K80.20 GALLSTONES: ICD-10-CM

## 2025-06-16 DIAGNOSIS — R10.11 RUQ PAIN: ICD-10-CM

## 2025-06-16 DIAGNOSIS — Z23 NEED FOR VACCINATION WITH 20-POLYVALENT PNEUMOCOCCAL CONJUGATE VACCINE: Primary | ICD-10-CM

## 2025-06-16 DIAGNOSIS — R10.11 RUQ PAIN: Primary | ICD-10-CM

## 2025-06-16 PROCEDURE — 90471 IMMUNIZATION ADMIN: CPT | Performed by: FAMILY MEDICINE

## 2025-06-16 PROCEDURE — 90677 PCV20 VACCINE IM: CPT | Performed by: FAMILY MEDICINE

## 2025-06-16 PROCEDURE — 76700 US EXAM ABDOM COMPLETE: CPT

## 2025-06-16 NOTE — PROGRESS NOTES
After informed signed consent, Prevnar 20 was given IM in the left deltoid as ordered by Dr. Hawkins. Patient tolerated the injection well.

## 2025-06-24 ENCOUNTER — PREP FOR PROCEDURE (OUTPATIENT)
Dept: SURGERY | Age: 60
End: 2025-06-24

## 2025-06-24 ENCOUNTER — OFFICE VISIT (OUTPATIENT)
Dept: SURGERY | Age: 60
End: 2025-06-24
Payer: OTHER GOVERNMENT

## 2025-06-24 VITALS
OXYGEN SATURATION: 96 % | HEART RATE: 60 BPM | WEIGHT: 175 LBS | BODY MASS INDEX: 25.92 KG/M2 | TEMPERATURE: 98.5 F | HEIGHT: 69 IN

## 2025-06-24 DIAGNOSIS — K80.10 CALCULUS OF GALLBLADDER WITH CHRONIC CHOLECYSTITIS WITHOUT OBSTRUCTION: Primary | ICD-10-CM

## 2025-06-24 PROCEDURE — 99203 OFFICE O/P NEW LOW 30 MIN: CPT | Performed by: SURGERY

## 2025-06-24 RX ORDER — SODIUM CHLORIDE 9 MG/ML
INJECTION, SOLUTION INTRAVENOUS PRN
OUTPATIENT
Start: 2025-06-24

## 2025-06-24 RX ORDER — SODIUM CHLORIDE 0.9 % (FLUSH) 0.9 %
5-40 SYRINGE (ML) INJECTION PRN
OUTPATIENT
Start: 2025-06-24

## 2025-06-24 RX ORDER — SODIUM CHLORIDE 0.9 % (FLUSH) 0.9 %
5-40 SYRINGE (ML) INJECTION EVERY 12 HOURS SCHEDULED
OUTPATIENT
Start: 2025-06-24

## 2025-06-24 RX ORDER — ACETAMINOPHEN 325 MG/1
1000 TABLET ORAL ONCE
OUTPATIENT
Start: 2025-06-24 | End: 2025-06-24

## 2025-06-24 RX ORDER — SODIUM CHLORIDE, SODIUM LACTATE, POTASSIUM CHLORIDE, CALCIUM CHLORIDE 600; 310; 30; 20 MG/100ML; MG/100ML; MG/100ML; MG/100ML
INJECTION, SOLUTION INTRAVENOUS CONTINUOUS
OUTPATIENT
Start: 2025-06-24

## 2025-06-24 RX ORDER — CELECOXIB 200 MG/1
200 CAPSULE ORAL ONCE
OUTPATIENT
Start: 2025-06-24 | End: 2025-06-24

## 2025-06-24 ASSESSMENT — ENCOUNTER SYMPTOMS
NAUSEA: 1
VOMITING: 1
ABDOMINAL PAIN: 1
ABDOMINAL DISTENTION: 0

## 2025-06-24 NOTE — PROGRESS NOTES
SUBJECTIVE:  Ms. Ortega is a 60 y.o. female who presents today with reports of epigastric pain that occurred about two weeks ago following breakfast of eggs and lozano. There was pressure in the epigastric region that was severe.  She was sweating and nauseated and vomiting. The pain radiated through to her back. She has not had any issues since.      Past Medical History:   Diagnosis Date    Bronchitis     CAD (coronary artery disease)     Cancer (HCC)     uterine     Cryptogenic stroke (HCC) 11/23/2016    Dr. Sinclair    Fracture     right arm    Hyperlipidemia     Hypertension     Status post placement of implantable loop recorder 11/23/2016    Dr Downs     Past Surgical History:   Procedure Laterality Date    BREAST BIOPSY      BREAST ENHANCEMENT SURGERY  1988    BREAST SURGERY      mass removal-right    COLONOSCOPY  11/26/2013    Dr Chan: normal 10y recall    COLONOSCOPY N/A 07/16/2021    Dr Bee, Int hemorrhoids Grade1, Mild diverticulosis, sub prep fair, 5 year recall    SKIN CANCER EXCISION Right 2023    hand    VICKY AND BSO (CERVIX REMOVED)      cervix removed    TUBAL LIGATION      UPPER GASTROINTESTINAL ENDOSCOPY N/A 07/16/2021    Dr Bee- gastritis, duodenitis, NEG celiac, NEG h pylori     Current Outpatient Medications   Medication Sig Dispense Refill    BIOTIN PO 1 tab po qd      diclofenac (VOLTAREN) 75 MG EC tablet Take 1 tablet by mouth 2 times daily 60 tablet 0    atorvastatin (LIPITOR) 40 MG tablet TAKE 1 TABLET DAILY FOR HIGH CHOLESTEROL 90 tablet 3    hydroCHLOROthiazide (HYDRODIURIL) 25 MG tablet TAKE 1 TABLET DAILY 90 tablet 3    ketoconazole (NIZORAL) 2 % cream Apply topically daily. 30 g 0    Ferrous Sulfate (IRON PO) Take by mouth daily      vitamin D (CHOLECALCIFEROL) 1000 UNIT TABS tablet Take 1 tablet by mouth daily      aspirin 81 MG EC tablet Take 1 tablet by mouth daily 30 tablet 3    Omega-3 Fatty Acids (FISH OIL) 1200 MG CAPS Take by mouth daily      Clobetasol

## 2025-06-24 NOTE — H&P (VIEW-ONLY)
Prop Oint-Coal Tar (CLOBETAPLUS OINTMENT EX) Apply 1 Applicatorful topically as needed       diclofenac sodium (VOLTAREN) 1 % GEL Apply 4 g topically 2 times daily (Patient not taking: Reported on 6/24/2025) 100 g 5     No current facility-administered medications for this visit.     Allergies: Poison ivy extract    Family History   Problem Relation Age of Onset    Heart Disease Mother         cardiomyopathy, pacemaker    Hypertension Mother     Cancer Mother         Non-hogkins lymphoma    Heart Disease Father     Lung Cancer Father     Heart Attack Brother         smoker, has stent    Heart Disease Sister         bypass, smoker    Thyroid Disease Sister         causes double vision    Heart Attack Sister     Elevated Lipids Sister     Thyroid Disease Sister     Elevated Lipids Sister     Elevated Lipids Sister     Heart Disease Sister         heart murmur    Breast Cancer Paternal Cousin 50    Colon Cancer Neg Hx     Esophageal Cancer Neg Hx     Liver Cancer Neg Hx     Rectal Cancer Neg Hx     Stomach Cancer Neg Hx        Social History     Tobacco Use    Smoking status: Never    Smokeless tobacco: Never   Substance Use Topics    Alcohol use: Yes     Alcohol/week: 2.0 standard drinks of alcohol     Types: 1 Glasses of wine, 1 Cans of beer per week     Comment: rare        Review of Systems   Constitutional:  Positive for activity change, appetite change and diaphoresis.   Gastrointestinal:  Positive for abdominal pain, nausea and vomiting. Negative for abdominal distention.       OBJECTIVE:  Pulse 60   Temp 98.5 °F (36.9 °C) (Temporal)   Ht 1.753 m (5' 9\")   Wt 79.4 kg (175 lb)   LMP 08/24/2017   SpO2 96%   BMI 25.84 kg/m²   Physical Exam  Vitals reviewed.   Constitutional:       Appearance: Normal appearance. She is well-developed.   HENT:      Head: Normocephalic and atraumatic.   Eyes:      Pupils: Pupils are equal, round, and reactive to light.   Cardiovascular:      Rate and Rhythm: Regular rhythm.

## 2025-07-01 ENCOUNTER — PATIENT MESSAGE (OUTPATIENT)
Age: 60
End: 2025-07-01

## 2025-07-01 DIAGNOSIS — M19.032 PRIMARY OSTEOARTHRITIS OF LEFT WRIST: ICD-10-CM

## 2025-07-01 DIAGNOSIS — M19.031 PRIMARY OSTEOARTHRITIS OF RIGHT WRIST: ICD-10-CM

## 2025-07-02 RX ORDER — DICLOFENAC SODIUM 75 MG/1
75 TABLET, DELAYED RELEASE ORAL 2 TIMES DAILY
Qty: 180 TABLET | Refills: 3 | Status: SHIPPED | OUTPATIENT
Start: 2025-07-02

## 2025-07-02 NOTE — TELEPHONE ENCOUNTER
We can but she have to be aware that long-term use of NSAIDs like diclofenac can be associated with GI bleed and renal failure.  Also associated with increased cardiovascular risk.  They are very helpful for osteoarthritis but unfortunately there is not a safe alternative to the medication.  Recommend to try to use as little as possible but if she need to take it regularly recommend to monitor renal function frequently, avoid dehydration and discontinue if black stools develop or epigastric pain develops.  May benefit for prophylactic use of a PPI like omeprazole 20 daily for GI protection

## 2025-07-09 ENCOUNTER — ANESTHESIA EVENT (OUTPATIENT)
Dept: OPERATING ROOM | Age: 60
End: 2025-07-09
Payer: OTHER GOVERNMENT

## 2025-07-09 ENCOUNTER — HOSPITAL ENCOUNTER (OUTPATIENT)
Age: 60
Setting detail: OUTPATIENT SURGERY
Discharge: HOME | End: 2025-07-09
Attending: SURGERY | Admitting: SURGERY
Payer: OTHER GOVERNMENT

## 2025-07-09 ENCOUNTER — ANESTHESIA (OUTPATIENT)
Dept: OPERATING ROOM | Age: 60
End: 2025-07-09
Payer: OTHER GOVERNMENT

## 2025-07-09 VITALS
WEIGHT: 172 LBS | BODY MASS INDEX: 25.48 KG/M2 | RESPIRATION RATE: 16 BRPM | DIASTOLIC BLOOD PRESSURE: 91 MMHG | TEMPERATURE: 96.9 F | HEART RATE: 61 BPM | HEIGHT: 69 IN | SYSTOLIC BLOOD PRESSURE: 189 MMHG | OXYGEN SATURATION: 100 %

## 2025-07-09 DIAGNOSIS — K80.10 CHOLELITHIASIS WITH CHRONIC CHOLECYSTITIS: ICD-10-CM

## 2025-07-09 PROCEDURE — S2900 ROBOTIC SURGICAL SYSTEM: HCPCS | Performed by: SURGERY

## 2025-07-09 PROCEDURE — 7100000000 HC PACU RECOVERY - FIRST 15 MIN: Performed by: SURGERY

## 2025-07-09 PROCEDURE — 3600000019 HC SURGERY ROBOT ADDTL 15MIN: Performed by: SURGERY

## 2025-07-09 PROCEDURE — 6360000002 HC RX W HCPCS: Performed by: SURGERY

## 2025-07-09 PROCEDURE — 7100000010 HC PHASE II RECOVERY - FIRST 15 MIN: Performed by: SURGERY

## 2025-07-09 PROCEDURE — 88304 TISSUE EXAM BY PATHOLOGIST: CPT

## 2025-07-09 PROCEDURE — C1889 IMPLANT/INSERT DEVICE, NOC: HCPCS | Performed by: SURGERY

## 2025-07-09 PROCEDURE — 6370000000 HC RX 637 (ALT 250 FOR IP): Performed by: ANESTHESIOLOGY

## 2025-07-09 PROCEDURE — 2500000003 HC RX 250 WO HCPCS

## 2025-07-09 PROCEDURE — 6360000002 HC RX W HCPCS

## 2025-07-09 PROCEDURE — 3700000000 HC ANESTHESIA ATTENDED CARE: Performed by: SURGERY

## 2025-07-09 PROCEDURE — 7100000011 HC PHASE II RECOVERY - ADDTL 15 MIN: Performed by: SURGERY

## 2025-07-09 PROCEDURE — 2580000003 HC RX 258: Performed by: SURGERY

## 2025-07-09 PROCEDURE — 6360000002 HC RX W HCPCS: Performed by: ANESTHESIOLOGY

## 2025-07-09 PROCEDURE — 3700000001 HC ADD 15 MINUTES (ANESTHESIA): Performed by: SURGERY

## 2025-07-09 PROCEDURE — 7100000001 HC PACU RECOVERY - ADDTL 15 MIN: Performed by: SURGERY

## 2025-07-09 PROCEDURE — 2500000003 HC RX 250 WO HCPCS: Performed by: SURGERY

## 2025-07-09 PROCEDURE — 6370000000 HC RX 637 (ALT 250 FOR IP): Performed by: SURGERY

## 2025-07-09 PROCEDURE — 2709999900 HC NON-CHARGEABLE SUPPLY: Performed by: SURGERY

## 2025-07-09 PROCEDURE — 3600000009 HC SURGERY ROBOT BASE: Performed by: SURGERY

## 2025-07-09 DEVICE — CLIP INT L POLYMER LOK LIG HEM O LOK (6EA/PK): Type: IMPLANTABLE DEVICE | Status: FUNCTIONAL

## 2025-07-09 RX ORDER — SODIUM CHLORIDE, SODIUM LACTATE, POTASSIUM CHLORIDE, CALCIUM CHLORIDE 600; 310; 30; 20 MG/100ML; MG/100ML; MG/100ML; MG/100ML
INJECTION, SOLUTION INTRAVENOUS CONTINUOUS
Status: DISCONTINUED | OUTPATIENT
Start: 2025-07-09 | End: 2025-07-09 | Stop reason: HOSPADM

## 2025-07-09 RX ORDER — SCOPOLAMINE 1 MG/3D
1 PATCH, EXTENDED RELEASE TRANSDERMAL
Status: DISCONTINUED | OUTPATIENT
Start: 2025-07-09 | End: 2025-07-09 | Stop reason: HOSPADM

## 2025-07-09 RX ORDER — DEXAMETHASONE SODIUM PHOSPHATE 10 MG/ML
8 INJECTION, SOLUTION INTRAMUSCULAR; INTRAVENOUS ONCE
Status: DISCONTINUED | OUTPATIENT
Start: 2025-07-09 | End: 2025-07-09 | Stop reason: HOSPADM

## 2025-07-09 RX ORDER — SODIUM CHLORIDE 0.9 % (FLUSH) 0.9 %
5-40 SYRINGE (ML) INJECTION EVERY 12 HOURS SCHEDULED
Status: DISCONTINUED | OUTPATIENT
Start: 2025-07-09 | End: 2025-07-09 | Stop reason: HOSPADM

## 2025-07-09 RX ORDER — ROCURONIUM BROMIDE 10 MG/ML
INJECTION, SOLUTION INTRAVENOUS
Status: DISCONTINUED | OUTPATIENT
Start: 2025-07-09 | End: 2025-07-09 | Stop reason: SDUPTHER

## 2025-07-09 RX ORDER — FAMOTIDINE 20 MG/1
20 TABLET, FILM COATED ORAL ONCE
Status: COMPLETED | OUTPATIENT
Start: 2025-07-09 | End: 2025-07-09

## 2025-07-09 RX ORDER — SODIUM CHLORIDE 9 MG/ML
INJECTION, SOLUTION INTRAVENOUS PRN
Status: DISCONTINUED | OUTPATIENT
Start: 2025-07-09 | End: 2025-07-09 | Stop reason: HOSPADM

## 2025-07-09 RX ORDER — ONDANSETRON 4 MG/1
4 TABLET, ORALLY DISINTEGRATING ORAL 3 TIMES DAILY PRN
Qty: 21 TABLET | Refills: 0 | Status: SHIPPED | OUTPATIENT
Start: 2025-07-09

## 2025-07-09 RX ORDER — ONDANSETRON 2 MG/ML
INJECTION INTRAMUSCULAR; INTRAVENOUS
Status: DISCONTINUED | OUTPATIENT
Start: 2025-07-09 | End: 2025-07-09 | Stop reason: SDUPTHER

## 2025-07-09 RX ORDER — BUPIVACAINE HYDROCHLORIDE 2.5 MG/ML
INJECTION, SOLUTION INFILTRATION; PERINEURAL PRN
Status: DISCONTINUED | OUTPATIENT
Start: 2025-07-09 | End: 2025-07-09 | Stop reason: ALTCHOICE

## 2025-07-09 RX ORDER — MIDAZOLAM HYDROCHLORIDE 2 MG/2ML
2 INJECTION, SOLUTION INTRAMUSCULAR; INTRAVENOUS
Status: DISCONTINUED | OUTPATIENT
Start: 2025-07-09 | End: 2025-07-09 | Stop reason: HOSPADM

## 2025-07-09 RX ORDER — METOCLOPRAMIDE HYDROCHLORIDE 5 MG/ML
10 INJECTION INTRAMUSCULAR; INTRAVENOUS
Status: DISCONTINUED | OUTPATIENT
Start: 2025-07-09 | End: 2025-07-09 | Stop reason: HOSPADM

## 2025-07-09 RX ORDER — PROPOFOL 10 MG/ML
INJECTION, EMULSION INTRAVENOUS
Status: DISCONTINUED | OUTPATIENT
Start: 2025-07-09 | End: 2025-07-09 | Stop reason: SDUPTHER

## 2025-07-09 RX ORDER — DIPHENHYDRAMINE HYDROCHLORIDE 50 MG/ML
12.5 INJECTION, SOLUTION INTRAMUSCULAR; INTRAVENOUS
Status: DISCONTINUED | OUTPATIENT
Start: 2025-07-09 | End: 2025-07-09 | Stop reason: HOSPADM

## 2025-07-09 RX ORDER — APREPITANT 40 MG/1
40 CAPSULE ORAL ONCE
Status: COMPLETED | OUTPATIENT
Start: 2025-07-09 | End: 2025-07-09

## 2025-07-09 RX ORDER — DEXAMETHASONE SODIUM PHOSPHATE 10 MG/ML
INJECTION, SOLUTION INTRAMUSCULAR; INTRAVENOUS
Status: DISCONTINUED | OUTPATIENT
Start: 2025-07-09 | End: 2025-07-09 | Stop reason: SDUPTHER

## 2025-07-09 RX ORDER — INDOCYANINE GREEN AND WATER 25 MG
KIT INJECTION PRN
Status: DISCONTINUED | OUTPATIENT
Start: 2025-07-09 | End: 2025-07-09 | Stop reason: ALTCHOICE

## 2025-07-09 RX ORDER — FENTANYL CITRATE 50 UG/ML
INJECTION, SOLUTION INTRAMUSCULAR; INTRAVENOUS
Status: DISCONTINUED | OUTPATIENT
Start: 2025-07-09 | End: 2025-07-09 | Stop reason: SDUPTHER

## 2025-07-09 RX ORDER — HYDROMORPHONE HYDROCHLORIDE 1 MG/ML
0.5 INJECTION, SOLUTION INTRAMUSCULAR; INTRAVENOUS; SUBCUTANEOUS EVERY 5 MIN PRN
Status: DISCONTINUED | OUTPATIENT
Start: 2025-07-09 | End: 2025-07-09 | Stop reason: HOSPADM

## 2025-07-09 RX ORDER — ACETAMINOPHEN 500 MG
1000 TABLET ORAL ONCE
Status: COMPLETED | OUTPATIENT
Start: 2025-07-09 | End: 2025-07-09

## 2025-07-09 RX ORDER — LIDOCAINE HYDROCHLORIDE 10 MG/ML
INJECTION, SOLUTION EPIDURAL; INFILTRATION; INTRACAUDAL; PERINEURAL
Status: DISCONTINUED | OUTPATIENT
Start: 2025-07-09 | End: 2025-07-09 | Stop reason: SDUPTHER

## 2025-07-09 RX ORDER — SODIUM CHLORIDE 0.9 % (FLUSH) 0.9 %
5-40 SYRINGE (ML) INJECTION PRN
Status: DISCONTINUED | OUTPATIENT
Start: 2025-07-09 | End: 2025-07-09 | Stop reason: HOSPADM

## 2025-07-09 RX ORDER — LIDOCAINE HYDROCHLORIDE 10 MG/ML
1 INJECTION, SOLUTION EPIDURAL; INFILTRATION; INTRACAUDAL; PERINEURAL
Status: DISCONTINUED | OUTPATIENT
Start: 2025-07-09 | End: 2025-07-09 | Stop reason: HOSPADM

## 2025-07-09 RX ORDER — CELECOXIB 200 MG/1
200 CAPSULE ORAL ONCE
Status: COMPLETED | OUTPATIENT
Start: 2025-07-09 | End: 2025-07-09

## 2025-07-09 RX ORDER — HYDROMORPHONE HYDROCHLORIDE 1 MG/ML
0.25 INJECTION, SOLUTION INTRAMUSCULAR; INTRAVENOUS; SUBCUTANEOUS EVERY 5 MIN PRN
Status: DISCONTINUED | OUTPATIENT
Start: 2025-07-09 | End: 2025-07-09 | Stop reason: HOSPADM

## 2025-07-09 RX ORDER — OXYCODONE AND ACETAMINOPHEN 5; 325 MG/1; MG/1
1 TABLET ORAL EVERY 6 HOURS PRN
Qty: 12 TABLET | Refills: 0 | Status: SHIPPED | OUTPATIENT
Start: 2025-07-09 | End: 2025-07-12

## 2025-07-09 RX ADMIN — SODIUM CHLORIDE, SODIUM LACTATE, POTASSIUM CHLORIDE, AND CALCIUM CHLORIDE: .6; .31; .03; .02 INJECTION, SOLUTION INTRAVENOUS at 07:08

## 2025-07-09 RX ADMIN — ACETAMINOPHEN 1000 MG: 500 TABLET ORAL at 07:11

## 2025-07-09 RX ADMIN — ONDANSETRON 4 MG: 2 INJECTION INTRAMUSCULAR; INTRAVENOUS at 08:35

## 2025-07-09 RX ADMIN — PROPOFOL 130 MG: 10 INJECTION, EMULSION INTRAVENOUS at 07:59

## 2025-07-09 RX ADMIN — FENTANYL CITRATE 100 MCG: 0.05 INJECTION, SOLUTION INTRAMUSCULAR; INTRAVENOUS at 07:59

## 2025-07-09 RX ADMIN — FAMOTIDINE 20 MG: 20 TABLET, FILM COATED ORAL at 07:21

## 2025-07-09 RX ADMIN — APREPITANT 40 MG: 40 CAPSULE ORAL at 07:21

## 2025-07-09 RX ADMIN — CEFAZOLIN 2000 MG: 1 INJECTION, POWDER, FOR SOLUTION INTRAMUSCULAR; INTRAVENOUS at 08:09

## 2025-07-09 RX ADMIN — SUGAMMADEX 300 MG: 100 INJECTION, SOLUTION INTRAVENOUS at 08:50

## 2025-07-09 RX ADMIN — CELECOXIB 200 MG: 200 CAPSULE ORAL at 07:11

## 2025-07-09 RX ADMIN — LIDOCAINE HYDROCHLORIDE 50 MG: 10 INJECTION, SOLUTION EPIDURAL; INFILTRATION; INTRACAUDAL; PERINEURAL at 07:59

## 2025-07-09 RX ADMIN — DEXAMETHASONE SODIUM PHOSPHATE 10 MG: 10 INJECTION, SOLUTION INTRAMUSCULAR; INTRAVENOUS at 08:05

## 2025-07-09 RX ADMIN — ROCURONIUM BROMIDE 60 MG: 10 INJECTION, SOLUTION INTRAVENOUS at 07:59

## 2025-07-09 ASSESSMENT — LIFESTYLE VARIABLES: SMOKING_STATUS: 0

## 2025-07-09 ASSESSMENT — PAIN - FUNCTIONAL ASSESSMENT
PAIN_FUNCTIONAL_ASSESSMENT: 0-10

## 2025-07-09 ASSESSMENT — PAIN DESCRIPTION - DESCRIPTORS: DESCRIPTORS: ACHING

## 2025-07-09 NOTE — INTERVAL H&P NOTE
Update History & Physical    The patient's History and Physical of June 24, 2025 was reviewed with the patient and I examined the patient. There was no change. The surgical site was confirmed by the patient and me.     Plan: The risks, benefits, expected outcome, and alternative to the recommended procedure have been discussed with the patient. Patient understands and wants to proceed with the procedure.     Electronically signed by Dee Dee Momin DO on 7/9/2025 at 7:46 AM

## 2025-07-09 NOTE — ANESTHESIA PRE PROCEDURE
Pulmonary:       (-) COPD, asthma and not a current smoker                           Cardiovascular:    (+) hypertension:, hyperlipidemia    (-) past MI and CABG/stent    ECG reviewed               Beta Blocker:  Not on Beta Blocker         Neuro/Psych:   (+) CVA:            GI/Hepatic/Renal:   (+) GERD: well controlled     (-) liver disease and no renal disease       Endo/Other:        (-) diabetes mellitus               Abdominal:             Vascular:          Other Findings:             Anesthesia Plan      general     ASA 3       Induction: intravenous.    MIPS: Postoperative opioids intended and Prophylactic antiemetics administered.  Anesthetic plan and risks discussed with patient and spouse.                        Bhakti Gilbert MD   7/9/2025

## 2025-07-09 NOTE — DISCHARGE INSTR - ACTIVITY
No heavy lifting.  May shower tomorrow.   Do not soak in tub.   Do not drive while on pain medications.      Avoid constipation.   Take colace nightly (up to 300 mg) and miralax daily (up to three doses).   Drink 64 ounces of water and take a powdered fiber supplement daily (ie-Metamucil).      If you have signs of infection, call you doctor. These include:   -Increased pain, swelling, warmth, or redness  -Red streaks leading from the incision  -Pus draining from the incision  -A fever > 100.4     Remove your scopalamine patch the day after surgery. Be sure to wash your hands when you touch it and when you remove it. It may dilate your eyes and cause blurry vision. If this happens please do not go to the emergency room as this is a side effect of the medication and will go away in 4-6 hours.

## 2025-07-09 NOTE — ANESTHESIA POSTPROCEDURE EVALUATION
Department of Anesthesiology  Postprocedure Note    Patient: Emerald Ortega  MRN: [id number]  YOB: 1965  Date of evaluation: 7/9/2025    Procedure Summary       Date: 07/09/25 Room / Location: [address]    Anesthesia Start: 0754 Anesthesia Stop: 0907    Procedure: ROBOTIC ASSISTED LAPAROSCOPIC CHOLECYSTECTOMY Diagnosis:       Cholelithiasis with chronic cholecystitis      (Cholelithiasis with chronic cholecystitis [K80.10])    Surgeons: Dee Dee Momin DO Responsible Provider: Estrella Grace APRN - CRNA    Anesthesia Type: general ASA Status: 3            Anesthesia Type: No value filed.    Froylan Phase I: Froylan Score: 9    Froylan Phase II:      Anesthesia Post Evaluation    Patient location during evaluation: PACU  Patient participation: complete - patient participated  Level of consciousness: awake  Pain score: 0  Airway patency: patent  Nausea & Vomiting: no nausea and no vomiting  Cardiovascular status: hemodynamically stable  Respiratory status: acceptable and spontaneous ventilation  Hydration status: stable  Comments: BP (!) 175/82   Pulse 96   Temp 97.3 °F (36.3 °C)   Resp 10   Ht 1.753 m (5' 9\")   Wt 78 kg (172 lb)   LMP 08/24/2017   SpO2 99%   BMI 25.40 kg/m²     Pain management: adequate    No notable events documented.

## 2025-07-09 NOTE — PROGRESS NOTES
CLINICAL PHARMACY NOTE: MEDS TO BEDS    Total # of Prescriptions Filled: 2   The following medications were delivered to the patient:  Oxycodone-acetaminophen 5-325mg  Ondansetron 4mg odt    Additional Documentation:  [employer] paid with cash  Pt picked up rx at pharmacy [facility]

## 2025-07-09 NOTE — OP NOTE
were placed, 10 mm lateral to the umbilicus on each side and on the right anterior axillary line.  The patient was placed in reverse trendelenberg position and rotated to the left.  The [employer] operating system was brought into the field and docked. Working instruments were advanced and the fundus of the gallbladder was grasped and elevated.  The neck was grabbed, placed on stretch and the triangle of Calot opened. Firefly vision was used to visualize the cystic duct and biliary anatomy.  The neck of the gallbladder was dissected with blunt and electorcautery to visualize the cystic duct.  This was cleared of  surrounding tissue. Adjacent to this the cystic artery was identified and also cleared of surrounding tissue.  The triangle of Calot was completely dissected and an excellent critical view of safety obtained.      The cystic duct was clipped proximally and distally with hemoclips and divided.  The cystic artery was divided in a similar fashion with cautery.  The gallbladder was then released from the undersurface of the liver using cautery.  Once free, it was placed it in an Endocatch retrieval bag and removed through the umbilical incision without problem.  The gallbladder was then sent to pathology.    The gallbladder fossa was without bleeding.  The cystic duct and cystic artery stumps were well seen with clip present and no evidence of bleeding or bile leak.    The working ports were removed under vision with no bleeding noted.  The pneumoperitoneum was released and the umbilical port removed.      Fascia at the umbilicus was reapproximated with 0 Vicryl suture.  Each port site was injected with 10 mL Experel.  Skin incisions were closed with interrupted 4-0 Monocryl subcuticular suture followed by Dermabond dressing.     Sponge, needle, instrument count correct on 2 occasions.    Estimated intraoperative blood loss, minimal.    Ms. Ortega tolerated her surgery well and she was taken to PACU in

## 2025-07-22 ENCOUNTER — OFFICE VISIT (OUTPATIENT)
Dept: SURGERY | Age: 60
End: 2025-07-22

## 2025-07-22 VITALS
BODY MASS INDEX: 25.92 KG/M2 | OXYGEN SATURATION: 98 % | TEMPERATURE: 96.9 F | HEIGHT: 69 IN | WEIGHT: 175 LBS | HEART RATE: 67 BPM

## 2025-07-22 DIAGNOSIS — Z09 POSTOPERATIVE EXAMINATION: Primary | ICD-10-CM

## 2025-07-22 PROCEDURE — 99024 POSTOP FOLLOW-UP VISIT: CPT | Performed by: SURGERY

## 2025-07-22 NOTE — PROGRESS NOTES
Postop Progress Note    Subjective    Emerald Ortega presents to the office for postop follow up two weeks s/p robotic cholecystectomy. She is doing well. Offers no complaints.    Objective    Vitals:    07/22/25 0915   Pulse: 67   Temp: 96.9 °F (36.1 °C)   SpO2: 98%     General: alert, cooperative and no distress  Incision: healing well    Assessment  Doing well postoperatively.    Plan  1. Continue any current medications  2. Wound care discussed  3. Pt is to increase activities as tolerated. No restrictions.  4. Usual diet  5. Follow up: as needed    Electronically signed by Dee Dee Momin DO on 7/22/2025 at 9:42 AM

## 2025-08-01 ENCOUNTER — TELEPHONE (OUTPATIENT)
Age: 60
End: 2025-08-01
Payer: OTHER GOVERNMENT

## 2025-08-01 NOTE — TELEPHONE ENCOUNTER
"Relay     \"Called patient and left a message asking her to have an xray at least one hour prior to her apt or before at the imaging center here at [facility]. I let the patient know the orders are in and she will not need an apt for an xray.\"                 "

## 2025-08-05 ENCOUNTER — PATIENT MESSAGE (OUTPATIENT)
Age: 60
End: 2025-08-05

## 2025-08-05 DIAGNOSIS — Z12.31 ENCOUNTER FOR SCREENING MAMMOGRAM FOR MALIGNANT NEOPLASM OF BREAST: Primary | ICD-10-CM

## 2025-08-07 ENCOUNTER — OFFICE VISIT (OUTPATIENT)
Age: 60
End: 2025-08-07

## 2025-08-07 ENCOUNTER — HOSPITAL ENCOUNTER (OUTPATIENT)
Dept: GENERAL RADIOLOGY | Facility: HOSPITAL | Age: 60
Discharge: HOME | End: 2025-08-07
Admitting: PODIATRIST
Payer: OTHER GOVERNMENT

## 2025-08-07 VITALS — BODY MASS INDEX: 25.92 KG/M2 | HEIGHT: 69 IN | WEIGHT: 175 LBS

## 2025-08-07 DIAGNOSIS — M25.531 BILATERAL WRIST PAIN: Primary | ICD-10-CM

## 2025-08-07 DIAGNOSIS — M20.42 HAMMER TOE OF LEFT FOOT: ICD-10-CM

## 2025-08-07 DIAGNOSIS — M19.032 PRIMARY OSTEOARTHRITIS OF LEFT WRIST: ICD-10-CM

## 2025-08-07 DIAGNOSIS — R20.2 PARESTHESIAS IN RIGHT HAND: ICD-10-CM

## 2025-08-07 DIAGNOSIS — M19.031 PRIMARY OSTEOARTHRITIS OF RIGHT WRIST: ICD-10-CM

## 2025-08-07 DIAGNOSIS — M25.532 BILATERAL WRIST PAIN: Primary | ICD-10-CM

## 2025-08-07 PROCEDURE — 73630 X-RAY EXAM OF FOOT: CPT

## 2025-08-07 RX ORDER — LIDOCAINE HYDROCHLORIDE 10 MG/ML
1 INJECTION, SOLUTION INFILTRATION; PERINEURAL ONCE
Status: COMPLETED | OUTPATIENT
Start: 2025-08-07 | End: 2025-08-07

## 2025-08-07 RX ORDER — TRIAMCINOLONE ACETONIDE 40 MG/ML
40 INJECTION, SUSPENSION INTRA-ARTICULAR; INTRAMUSCULAR ONCE
Status: COMPLETED | OUTPATIENT
Start: 2025-08-07 | End: 2025-08-07

## 2025-08-07 RX ADMIN — TRIAMCINOLONE ACETONIDE 40 MG: 40 INJECTION, SUSPENSION INTRA-ARTICULAR; INTRAMUSCULAR at 08:37

## 2025-08-07 RX ADMIN — LIDOCAINE HYDROCHLORIDE 1 ML: 10 INJECTION, SOLUTION INFILTRATION; PERINEURAL at 08:36

## 2025-08-08 ENCOUNTER — OFFICE VISIT (OUTPATIENT)
Age: 60
End: 2025-08-08
Payer: OTHER GOVERNMENT

## 2025-08-08 VITALS
WEIGHT: 175 LBS | HEART RATE: 74 BPM | DIASTOLIC BLOOD PRESSURE: 64 MMHG | SYSTOLIC BLOOD PRESSURE: 118 MMHG | BODY MASS INDEX: 26.52 KG/M2 | HEIGHT: 68 IN | OXYGEN SATURATION: 99 %

## 2025-08-08 DIAGNOSIS — B35.1 ONYCHOMYCOSIS: ICD-10-CM

## 2025-08-08 DIAGNOSIS — M79.672 FOOT PAIN, LEFT: ICD-10-CM

## 2025-08-08 DIAGNOSIS — M20.5X2 MALLET TOE OF LEFT FOOT: Primary | ICD-10-CM

## 2025-08-08 PROBLEM — K80.10 CHOLELITHIASIS WITH CHRONIC CHOLECYSTITIS: Status: ACTIVE | Noted: 2025-06-24

## 2025-08-08 PROBLEM — K59.09 CHRONIC CONSTIPATION: Status: ACTIVE | Noted: 2021-06-30

## 2025-08-08 PROBLEM — C54.1 ENDOMETRIAL CANCER: Status: ACTIVE | Noted: 2025-08-08

## 2025-08-08 PROBLEM — D04.61: Status: ACTIVE | Noted: 2022-09-29

## 2025-08-08 RX ORDER — TERBINAFINE HYDROCHLORIDE 250 MG/1
250 TABLET ORAL DAILY
Qty: 90 TABLET | Refills: 0 | Status: SHIPPED | OUTPATIENT
Start: 2025-08-08

## 2025-08-28 ENCOUNTER — HOSPITAL ENCOUNTER (OUTPATIENT)
Dept: WOMENS IMAGING | Age: 60
Discharge: HOME | End: 2025-08-28
Payer: OTHER GOVERNMENT

## 2025-08-28 VITALS — BODY MASS INDEX: 25.76 KG/M2 | HEIGHT: 68 IN | WEIGHT: 170 LBS

## 2025-08-28 DIAGNOSIS — Z12.31 ENCOUNTER FOR SCREENING MAMMOGRAM FOR MALIGNANT NEOPLASM OF BREAST: ICD-10-CM

## 2025-08-28 PROCEDURE — 77063 BREAST TOMOSYNTHESIS BI: CPT

## (undated) DEVICE — GLOVE SURG SZ 7 CRM LTX FREE POLYISOPRENE POLYMER BEAD ANTI

## (undated) DEVICE — ANTIBACTERIAL UNDYED BRAIDED (POLYGLACTIN 910), SYNTHETIC ABSORBABLE SUTURE: Brand: COATED VICRYL

## (undated) DEVICE — GOWN, ORBIS, XLONG/XLARGE, STERILE: Brand: MEDLINE

## (undated) DEVICE — ENDO KIT,LOURDES HOSPITAL: Brand: MEDLINE INDUSTRIES, INC.

## (undated) DEVICE — SUTURE MONOCRYL SZ 4-0 L18IN ABSRB UD L19MM PS-2 3/8 CIR PRIM Y496G

## (undated) DEVICE — TISSUE RETRIEVAL SYSTEM: Brand: INZII RETRIEVAL SYSTEM

## (undated) DEVICE — BNDG ELAS MATRIX S/CLS 4IN 10YD LF STRL

## (undated) DEVICE — DRAPE,HAND,STERILE: Brand: MEDLINE

## (undated) DEVICE — SYR LUERLOK 30CC

## (undated) DEVICE — TOWEL,OR,DSP,ST,BLUE,STD,4/PK,20PK/CS: Brand: MEDLINE

## (undated) DEVICE — SOLUTION ANTIFOG VIS SYS CLEARIFY LAPSCP

## (undated) DEVICE — 3M™ STERI-DRAPE™ INSTRUMENT POUCH 1018: Brand: STERI-DRAPE™

## (undated) DEVICE — PROXIMATE RH ROTATING HEAD SKIN STAPLERS (35 REGULAR) CONTAINS 35 STAINLESS STEEL STAPLES: Brand: PROXIMATE

## (undated) DEVICE — SYRINGE MED 50ML LUERLOCK TIP

## (undated) DEVICE — SEAL

## (undated) DEVICE — GLV SURG BIOGEL LTX PF 6 1/2

## (undated) DEVICE — DRSNG SURESITE WNDW 4X4.5

## (undated) DEVICE — BAPTIST TURNOVER KIT: Brand: MEDLINE INDUSTRIES, INC.

## (undated) DEVICE — TRAP FLD MINIVAC MEGADYNE 100ML

## (undated) DEVICE — SUTURE VICRYL CTD ANTBCTRL 54 IN TI PLU VLT

## (undated) DEVICE — MAJOR DOUBLE BASIN W/GOWNS II: Brand: MEDLINE INDUSTRIES, INC.

## (undated) DEVICE — COVER LT HNDL BLU PLAS

## (undated) DEVICE — ADHS LIQ MASTISOL 2/3ML

## (undated) DEVICE — ARM DRAPE

## (undated) DEVICE — LIQUIBAND RAPID ADHESIVE 36/CS 0.8ML: Brand: MEDLINE

## (undated) DEVICE — BLADELESS OBTURATOR: Brand: WECK VISTA

## (undated) DEVICE — SYR CONTRL PRESS/LO FIX/M/LL W/THMB/RNG 10ML

## (undated) DEVICE — AIRSEAL BIFURCATED FILTERED TUBESET WITH ACTIVATED CHARCOAL FILTER: Brand: AIRSEAL

## (undated) DEVICE — COVER,MAYO STAND,STERILE: Brand: MEDLINE

## (undated) DEVICE — COLUMN DRAPE

## (undated) DEVICE — STRIP CLS WND CURAD MEDI/STRIP HYPOALLERG 0.25X4IN PK/10

## (undated) DEVICE — HYPODERMIC SAFETY NEEDLE: Brand: MAGELLAN

## (undated) DEVICE — AIRSEAL 8 MM CANNULA CAP AND OBTURATOR WITH BLADELESS OPTICAL TIP COMPATIBLE WITH INTUITIVE DA VINCI XI AND DA VINCI X 8 MM INSTRUMENT CANNULA, STANDARD LENGTH: Brand: AIRSEAL

## (undated) DEVICE — SYRINGE 20ML LL S/C 50

## (undated) DEVICE — DRSNG GZ CURAD XEROFORM NONADHS 5X9IN STRL

## (undated) DEVICE — FORCEPS BX 240CM 2.4MM L NDL RAD JAW 4 M00513334

## (undated) DEVICE — APPL CHLORAPREP HI/LITE 26ML ORNG

## (undated) DEVICE — MARKR SKIN W/RULR AND LBL

## (undated) DEVICE — SUT GUT CHRM 4/0 P3 18IN 1654G

## (undated) DEVICE — UTILITY MARKER W/MED LABELS: Brand: MEDLINE

## (undated) DEVICE — PROXIMATE RH ROTATING HEAD SKIN STAPLERS (35 WIDE) CONTAINS 35 STAINLESS STEEL STAPLES: Brand: PROXIMATE

## (undated) DEVICE — GLV SURG BIOGEL LTX PF 8

## (undated) DEVICE — Device

## (undated) DEVICE — SPNG GZ STRL 2S 4X4 12PLY